# Patient Record
Sex: FEMALE | Race: WHITE | NOT HISPANIC OR LATINO | Employment: UNEMPLOYED | URBAN - METROPOLITAN AREA
[De-identification: names, ages, dates, MRNs, and addresses within clinical notes are randomized per-mention and may not be internally consistent; named-entity substitution may affect disease eponyms.]

---
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
## 2023-01-24 ENCOUNTER — OFFICE VISIT (OUTPATIENT)
Dept: FAMILY MEDICINE CLINIC | Facility: CLINIC | Age: 18
End: 2023-01-24
Payer: COMMERCIAL

## 2023-01-24 VITALS
BODY MASS INDEX: 26.98 KG/M2 | WEIGHT: 158 LBS | OXYGEN SATURATION: 98 % | TEMPERATURE: 98.2 F | HEIGHT: 64 IN | HEART RATE: 95 BPM

## 2023-01-24 DIAGNOSIS — F41.1 GAD (GENERALIZED ANXIETY DISORDER): Primary | ICD-10-CM

## 2023-01-24 DIAGNOSIS — Z09 ENCOUNTER FOR EXAMINATION FOLLOWING TREATMENT AT HOSPITAL: ICD-10-CM

## 2023-01-24 DIAGNOSIS — F41.0 PANIC ATTACK: ICD-10-CM

## 2023-01-24 PROCEDURE — 99213 OFFICE O/P EST LOW 20 MIN: CPT | Performed by: NURSE PRACTITIONER

## 2023-01-24 RX ORDER — VENLAFAXINE HYDROCHLORIDE 37.5 MG/1
37.5 CAPSULE, EXTENDED RELEASE ORAL DAILY
Qty: 30 CAPSULE | Refills: 0 | Status: SHIPPED | OUTPATIENT
Start: 2023-01-24 | End: 2023-01-27 | Stop reason: SDUPTHER

## 2023-01-24 NOTE — PROGRESS NOTES
Chief Complaint  Cough (Causing chest to hurt, went to ER 1 week ago and diagnosed with URI.), Sore Throat, and Anxiety (Refill Venlafaxine)    Subjective            Ashleigh Magdaleno presents to [facility]  History of Present Illness  Patient reports she was seen in the emergency room 1 week ago and diagnosed with atypical chest pain and anxiety and was placed on venlafaxine lowest dose 37.5 mg daily and given hydroxyzine for as needed use--they did do an EKG and it was normal and no labs are seen in the system and patient is here for a refill on the venlafaxine    And patient with the prior diagnosis of STEVE and was on Prozac 10 mg daily approx 3 weeks ago and pt was unable to take-and we only wrote the liquid prozac R/T last visit pt was explaining swallowing issues from the anxiety     Then pt did also reach out to her who she saw in the past through [facility] in [facility] --pt was on in the past:     effexor XR  zoloft  buspar (caused dizziness--could not take)   Hydroxyzine (possibly--pt unsure)   No CS prescribed    Then also pt reports now able to swallow the lowest dose effexor with trouble--    ______________________________________________________    Also pt reports the next day after seen in the  was seen in [place] ER for the URI s/s and they tested her for strep and COVID and flu and was (-) for everything--and just supportive care and nothing Rx given at that time       PHQ-2 Total Score:    PHQ-9 Total Score:      Past Medical History:   Diagnosis Date   • Anxiety    • Chlamydia infection during pregnancy 03/03/2022   • Febrile seizure (HCC)    • PCOS (polycystic ovarian syndrome)    • PFO (patent foramen ovale)        Allergies   Allergen Reactions   • Buspirone Dizziness and Other (See Comments)     dizziness        Past Surgical History:   Procedure Laterality Date   • ADENOIDECTOMY     • APPENDECTOMY     • LIPOMA EXCISION      foot   •  TONSILLECTOMY          Social History     Tobacco Use   • Smoking status: Never     Passive exposure: Never   • Smokeless tobacco: Never   Vaping Use   • Vaping Use: Every day   • Substances: Nicotine   • Devices: Disposable   Substance Use Topics   • Alcohol use: Never   • Drug use: Never       Family History   Problem Relation Age of Onset   • No Known Problems Mother    • No Known Problems Father    • Ovarian cancer Maternal Grandmother    • Colon cancer Maternal Grandmother         Health Maintenance Due   Topic Date Due   • COVID-19 Vaccine (1) Never done   • HPV VACCINES (2 - 3-dose series) 03/07/2019   • ANNUAL PHYSICAL  Never done   • INFLUENZA VACCINE  Never done        Current Outpatient Medications on File Prior to Visit   Medication Sig   • Etonogestrel (NEXPLANON) 68 MG implant subdermal implant Inject 1 each into the appropriate area of the skin as directed by provider 1 (One) Time.   • hydrOXYzine (ATARAX) 10 MG tablet Take 1 tablet by mouth 3 (Three) Times a Day As Needed for Anxiety.   • [DISCONTINUED] venlafaxine XR (Effexor XR) 37.5 MG 24 hr capsule Take 1 capsule by mouth Daily for 21 days.   • [DISCONTINUED] FLUoxetine (PROzac) 20 MG/5ML solution Take 2.5 mL by mouth Daily for 30 days.     No current facility-administered medications on file prior to visit.       Immunization History   Administered Date(s) Administered   • DTaP 2003, 2003, 2003, 05/10/2004, 02/21/2007   • DTaP, Unspecified 2003, 2003, 2003, 05/10/2004, 02/21/2007   • HPV Bivalent 02/07/2019   • Hep A, 2 Dose 07/08/2014, 08/07/2018   • Hep B / HiB 2003   • Hep B, Adolescent or Pediatric 2003, 2003, 01/28/2004, 12/08/2004   • Hepatitis A 07/08/2014, 08/07/2018   • Hepatitis B 2003, 2003, 12/18/2004   • HiB 2003, 2003, 12/08/2004   • Hib (PRP-OMP) 2003   • Hib (PRP-T) 2003, 2003, 12/08/2004   • IPV 2003, 2003, 01/28/2004,  "12/08/2004, 02/21/2007   • MMR 01/28/2004, 12/08/2004, 02/21/2007, 09/04/2022   • MMRV 02/21/2007   • Meningococcal Conjugate 07/08/2014, 02/07/2019   • Meningococcal MCV4P (Menactra) 07/08/2014, 02/07/2019   • PEDS-Pneumococcal Conjugate (PCV7) 2003, 2003, 2003   • Pneumococcal Conjugate 13-Valent (PCV13) 2003, 2003, 2003   • Tdap 09/27/2013   • Varicella 05/10/2004, 07/08/2014       Review of Systems   Constitutional: Positive for appetite change, chills and fatigue. Negative for fever.   HENT: Positive for postnasal drip, rhinorrhea and sinus pressure.    Respiratory: Positive for cough.    Cardiovascular: Positive for palpitations.        Some associated with the anxiety    Gastrointestinal: Positive for diarrhea. Negative for nausea and vomiting.   Neurological: Positive for headache.   Psychiatric/Behavioral: Negative for self-injury, suicidal ideas and depressed mood. The patient is nervous/anxious.         Objective     Pulse 95   Temp 98.2 °F (36.8 °C)   Ht 162.6 cm (64\")   Wt 71.7 kg (158 lb)   SpO2 98%   BMI 27.12 kg/m²       Physical Exam  Vitals and nursing note reviewed.   Constitutional:       Appearance: Normal appearance.   HENT:      Head: Normocephalic.      Right Ear: External ear normal.      Left Ear: External ear normal.      Nose: Nose normal.      Mouth/Throat:      Comments: Wearing mask  Eyes:      Pupils: Pupils are equal, round, and reactive to light.   Cardiovascular:      Rate and Rhythm: Normal rate and regular rhythm.      Heart sounds: Normal heart sounds.   Pulmonary:      Effort: Pulmonary effort is normal.      Breath sounds: Normal breath sounds.   Abdominal:      Palpations: Abdomen is soft.   Musculoskeletal:         General: Normal range of motion.      Cervical back: Normal range of motion.   Skin:     General: Skin is warm and dry.   Neurological:      Mental Status: She is alert and oriented to person, place, and time. "   Psychiatric:         Mood and Affect: Mood normal.         Behavior: Behavior normal.         Thought Content: Thought content normal.         Judgment: Judgment normal.         Result Review :             UC with Ever Bagley APRN (01/18/2023)  ECG 12 Lead Chest Pain (01/18/2023 17:14)               Assessment and Plan      Diagnoses and all orders for this visit:    1. STEVE (generalized anxiety disorder) (Primary)  -     venlafaxine XR (Effexor XR) 37.5 MG 24 hr capsule; Take 1 capsule by mouth Daily.  Dispense: 30 capsule; Refill: 0  -     Ambulatory Referral to Psychiatry    2. Panic attack  -     venlafaxine XR (Effexor XR) 37.5 MG 24 hr capsule; Take 1 capsule by mouth Daily.  Dispense: 30 capsule; Refill: 0  -     Ambulatory Referral to Psychiatry    3. Encounter for examination following treatment at hospital  Comments:  ER dept and UC     we will refill the effexor lowest dose and pt will F/U in approx 6 weeks prior to running out and then we will see if titration of the effexor is needed--as in the past pt was on 75 mg daily         Follow Up     Return in about 6 weeks (around 3/7/2023), or if symptoms worsen or fail to improve, for Recheck.

## 2023-01-27 DIAGNOSIS — F41.0 PANIC ATTACK: ICD-10-CM

## 2023-01-27 DIAGNOSIS — F41.1 GAD (GENERALIZED ANXIETY DISORDER): ICD-10-CM

## 2023-01-27 NOTE — TELEPHONE ENCOUNTER
Caller: Ashleigh Magdaleno    Relationship: Self    Best call back number: [phone]    Requested Prescriptions:   Requested Prescriptions     Pending Prescriptions Disp Refills   • venlafaxine XR (Effexor XR) 37.5 MG 24 hr capsule 30 capsule 0     Sig: Take 1 capsule by mouth Daily.        Pharmacy where request should be sent: [address] [phone] [facility] [phone] [facility]     Additional details provided by patient: PATIENT CALLED STATING THAT SHE WENT TO AN APPOINTMENT AND GOT THE MEDICATION PRESCRIBED BUT WHEN SHE WENT TO THE PHARMACY TODAY, THEY DID NOT HAVE ANY RECORD OF THE PRESCRIPTION BEING CALLED IN FOR HER. PATIENT STATES SHE HAS ABOUT A WEEK AND A HALF OF THE MEDICATION LEFT.    Does the patient have less than a 3 day supply:  [] Yes  [x] No    Would you like a call back once the refill request has been completed: [] Yes [x] No    If the office needs to give you a call back, can they leave a voicemail: [x] Yes [] No    Kym Urbina Rep   01/27/23 16:20 EST

## 2023-01-30 RX ORDER — VENLAFAXINE HYDROCHLORIDE 37.5 MG/1
37.5 CAPSULE, EXTENDED RELEASE ORAL DAILY
Qty: 30 CAPSULE | Refills: 0 | Status: SHIPPED | OUTPATIENT
Start: 2023-01-30 | End: 2023-03-06 | Stop reason: SDUPTHER

## 2023-02-16 ENCOUNTER — APPOINTMENT (OUTPATIENT)
Dept: GENERAL RADIOLOGY | Facility: HOSPITAL | Age: 18
End: 2023-02-16
Payer: COMMERCIAL

## 2023-02-16 ENCOUNTER — HOSPITAL ENCOUNTER (EMERGENCY)
Facility: HOSPITAL | Age: 18
Discharge: HOME | End: 2023-02-16
Attending: EMERGENCY MEDICINE | Admitting: EMERGENCY MEDICINE
Payer: COMMERCIAL

## 2023-02-16 VITALS
RESPIRATION RATE: 18 BRPM | HEART RATE: 116 BPM | BODY MASS INDEX: 26.5 KG/M2 | WEIGHT: 155.2 LBS | SYSTOLIC BLOOD PRESSURE: 133 MMHG | TEMPERATURE: 99.5 F | DIASTOLIC BLOOD PRESSURE: 75 MMHG | OXYGEN SATURATION: 97 % | HEIGHT: 64 IN

## 2023-02-16 DIAGNOSIS — N39.0 URINARY TRACT INFECTION WITH HEMATURIA, SITE UNSPECIFIED: Primary | ICD-10-CM

## 2023-02-16 DIAGNOSIS — M41.80 LEVOSCOLIOSIS: ICD-10-CM

## 2023-02-16 DIAGNOSIS — R31.9 URINARY TRACT INFECTION WITH HEMATURIA, SITE UNSPECIFIED: Primary | ICD-10-CM

## 2023-02-16 LAB
BACTERIA UR QL AUTO: ABNORMAL /HPF
BILIRUB UR QL STRIP: NEGATIVE
CLARITY UR: ABNORMAL
COLOR UR: YELLOW
GLUCOSE UR STRIP-MCNC: NEGATIVE MG/DL
HGB UR QL STRIP.AUTO: ABNORMAL
HYALINE CASTS UR QL AUTO: ABNORMAL /LPF
KETONES UR QL STRIP: NEGATIVE
LEUKOCYTE ESTERASE UR QL STRIP.AUTO: ABNORMAL
NITRITE UR QL STRIP: POSITIVE
PH UR STRIP.AUTO: 6 [PH] (ref 5–8)
PROT UR QL STRIP: ABNORMAL
RBC # UR STRIP: ABNORMAL /HPF
REF LAB TEST METHOD: ABNORMAL
SP GR UR STRIP: 1.02 (ref 1–1.03)
SQUAMOUS #/AREA URNS HPF: ABNORMAL /HPF
UROBILINOGEN UR QL STRIP: ABNORMAL
WBC # UR STRIP: ABNORMAL /HPF

## 2023-02-16 PROCEDURE — 87086 URINE CULTURE/COLONY COUNT: CPT

## 2023-02-16 PROCEDURE — 74018 RADEX ABDOMEN 1 VIEW: CPT

## 2023-02-16 PROCEDURE — 81001 URINALYSIS AUTO W/SCOPE: CPT | Performed by: EMERGENCY MEDICINE

## 2023-02-16 PROCEDURE — 87077 CULTURE AEROBIC IDENTIFY: CPT

## 2023-02-16 PROCEDURE — 99283 EMERGENCY DEPT VISIT LOW MDM: CPT

## 2023-02-16 PROCEDURE — 25010000002 KETOROLAC TROMETHAMINE PER 15 MG

## 2023-02-16 PROCEDURE — 25010000002 CEFTRIAXONE PER 250 MG

## 2023-02-16 PROCEDURE — 0 LIDOCAINE 1 % SOLUTION 10 ML VIAL

## 2023-02-16 PROCEDURE — 96372 THER/PROPH/DIAG INJ SC/IM: CPT

## 2023-02-16 PROCEDURE — 87186 SC STD MICRODIL/AGAR DIL: CPT

## 2023-02-16 RX ORDER — CEPHALEXIN 500 MG/1
500 CAPSULE ORAL 2 TIMES DAILY
Qty: 14 CAPSULE | Refills: 0 | Status: SHIPPED | OUTPATIENT
Start: 2023-02-16 | End: 2023-02-23

## 2023-02-16 RX ORDER — KETOROLAC TROMETHAMINE 30 MG/ML
30 INJECTION, SOLUTION INTRAMUSCULAR; INTRAVENOUS ONCE
Status: COMPLETED | OUTPATIENT
Start: 2023-02-16 | End: 2023-02-16

## 2023-02-16 RX ORDER — CYCLOBENZAPRINE HCL 10 MG
10 TABLET ORAL ONCE
Status: COMPLETED | OUTPATIENT
Start: 2023-02-16 | End: 2023-02-16

## 2023-02-16 RX ADMIN — KETOROLAC TROMETHAMINE 30 MG: 30 INJECTION, SOLUTION INTRAMUSCULAR; INTRAVENOUS at 10:45

## 2023-02-16 RX ADMIN — CYCLOBENZAPRINE 10 MG: 10 TABLET, FILM COATED ORAL at 10:45

## 2023-02-16 RX ADMIN — LIDOCAINE HYDROCHLORIDE 1 G: 10 INJECTION, SOLUTION INFILTRATION; PERINEURAL at 12:01

## 2023-02-16 NOTE — DISCHARGE INSTRUCTIONS
Please drink lots of fluids, please take your antibiotics as prescribed till finished for UTI  [facility] with your urine culture results and to discuss whether antibiotics need to be continued or switch to a different antibiotic

## 2023-02-16 NOTE — ED PROVIDER NOTES
Time: 10:37 AM EST  Date of encounter:  2/16/2023  Independent Historian/Clinical History and Information was obtained by:   Patient  Chief Complaint: back pain    History is limited by: N/A    History of Present Illness:  Patient is a 18 y.o. year old female who presents to the emergency department for evaluation of complaints of right flank pain x4 days.  Patient states that the pain radiates down her back around to the right lower quadrant and down her right anterior thigh.  Patient describes it as an ache.  She states that she slid from the bed a couple days ago hitting her left anterior thigh on the bed, denies falling onto her back or buttocks.  Patient also has complaints of hematuria that she noted yesterday.  She has taken Tylenol yesterday with no relief.  She denies saddle anesthesia, urinary or bowel incontinency.    HPI    Patient Care Team  Primary Care Provider: Lydia Lacy APRN    Past Medical History:     Allergies   Allergen Reactions   • Buspirone Dizziness and Other (See Comments)     dizziness     Past Medical History:   Diagnosis Date   • Anxiety    • Chlamydia infection during pregnancy 03/03/2022   • Febrile seizure (HCC)    • PCOS (polycystic ovarian syndrome)    • PFO (patent foramen ovale)      Past Surgical History:   Procedure Laterality Date   • ADENOIDECTOMY     • APPENDECTOMY     • LIPOMA EXCISION      foot   • TONSILLECTOMY       Family History   Problem Relation Age of Onset   • No Known Problems Mother    • No Known Problems Father    • Ovarian cancer Maternal Grandmother    • Colon cancer Maternal Grandmother        Home Medications:  Prior to Admission medications    Medication Sig Start Date End Date Taking? Authorizing Provider   Etonogestrel (NEXPLANON) 68 MG implant subdermal implant Inject 1 each into the appropriate area of the skin as directed by provider 1 (One) Time.    Provider, MD Concepción   hydrOXYzine (ATARAX) 10 MG tablet Take 1 tablet by mouth 3  "(Three) Times a Day As Needed for Anxiety. 1/18/23   Ever Bagley APRN   venlafaxine XR (Effexor XR) 37.5 MG 24 hr capsule Take 1 capsule by mouth Daily. 1/30/23   Lydia Lacy APRN        Social History:   Social History     Tobacco Use   • Smoking status: Never     Passive exposure: Never   • Smokeless tobacco: Never   Vaping Use   • Vaping Use: Every day   • Substances: Nicotine   • Devices: Disposable   Substance Use Topics   • Alcohol use: Never   • Drug use: Never         Review of Systems:  Review of Systems   Constitutional: Negative.    HENT: Negative.    Eyes: Negative.    Respiratory: Negative.    Cardiovascular: Negative.    Gastrointestinal: Negative.    Endocrine: Negative.    Genitourinary: Positive for flank pain and hematuria. Negative for dysuria.   Musculoskeletal: Positive for back pain.   Skin: Negative.    Allergic/Immunologic: Negative.    Neurological: Negative.    Hematological: Negative.    Psychiatric/Behavioral: Negative.         Physical Exam:  /75 (BP Location: Right arm, Patient Position: Sitting)   Pulse 116   Temp 99.5 °F (37.5 °C) (Oral)   Resp 18   Ht 162.6 cm (64\")   Wt 70.4 kg (155 lb 3.3 oz)   SpO2 97%   BMI 26.64 kg/m²     Physical Exam  Vitals and nursing note reviewed.   Constitutional:       Appearance: Normal appearance. She is normal weight.   HENT:      Head: Normocephalic and atraumatic.      Nose: Nose normal.      Mouth/Throat:      Mouth: Mucous membranes are moist.   Eyes:      Extraocular Movements: Extraocular movements intact.      Conjunctiva/sclera: Conjunctivae normal.      Pupils: Pupils are equal, round, and reactive to light.   Cardiovascular:      Rate and Rhythm: Normal rate and regular rhythm.      Heart sounds: Normal heart sounds.   Pulmonary:      Effort: Pulmonary effort is normal.      Breath sounds: Normal breath sounds.   Abdominal:      Palpations: Abdomen is soft.   Musculoskeletal:         General: Normal range of " motion.      Cervical back: Normal, normal range of motion and neck supple.      Thoracic back: Normal.      Lumbar back: Normal.        Back:    Skin:     General: Skin is warm and dry.   Neurological:      General: No focal deficit present.      Mental Status: She is alert and oriented to person, place, and time.   Psychiatric:         Mood and Affect: Mood normal.         Behavior: Behavior normal.                  Procedures:  Procedures      Medical Decision Making:      Comorbidities that affect care:    None    External Notes reviewed:    None      The following orders were placed and all results were independently analyzed by me:  Orders Placed This Encounter   Procedures   • XR Abdomen KUB   • Urinalysis With Microscopic If Indicated (No Culture) - Urine, Clean Catch   • Urinalysis, Microscopic Only - Urine, Clean Catch   • Urinalysis With Culture If Indicated -       Medications Given in the Emergency Department:  Medications   cefTRIAXone (ROCEPHIN) 350 mg/ml in lidocaine 1% IM syringe (1 gm vial) (has no administration in time range)   ketorolac (TORADOL) injection 30 mg (30 mg Intramuscular Given 2/16/23 1045)   cyclobenzaprine (FLEXERIL) tablet 10 mg (10 mg Oral Given 2/16/23 1045)        ED Course:         Labs:    Lab Results (last 24 hours)     Procedure Component Value Units Date/Time    Urinalysis With Microscopic If Indicated (No Culture) - Urine, Clean Catch [[id number]]  (Abnormal) Collected: 02/16/23 1034    Specimen: Urine, Clean Catch Updated: 02/16/23 1114     Color, UA Yellow     Appearance, UA Cloudy     pH, UA 6.0     Specific Gravity, UA 1.021     Glucose, UA Negative     Ketones, UA Negative     Bilirubin, UA Negative     Blood, UA Large (3+)     Protein, UA Trace     Leuk Esterase, UA Moderate (2+)     Nitrite, UA Positive     Urobilinogen, UA 1.0 E.U./dL    Urinalysis, Microscopic Only - Urine, Clean Catch [[id number]]  (Abnormal) Collected: 02/16/23 1034    Specimen: Urine, Clean  Catch Updated: 02/16/23 1114     RBC, UA None Seen /HPF      WBC, UA 0-2 /HPF      Bacteria, UA Trace /HPF      Squamous Epithelial Cells, UA None Seen /HPF      Hyaline Casts, UA None Seen /LPF      Methodology Manual Light Microscopy           Imaging:    XR Abdomen KUB    Result Date: 2/16/2023  PROCEDURE: XR ABDOMEN KUB  COMPARISON: [facility], , ABDOMEN SERIES ACUTE, 10/16/2019, 2:35.  INDICATIONS: dysuria/right sided flank pain for 2-3 days  FINDINGS:  There is a slight levoscoliosis of the lumbar spine which seems like a change from prior study.  There is a scattered amount a gas and residue within bowel in a nonspecific fashion.  No obvious radiopaque urinary tract calculi are appreciated.        1. Nonspecific bowel gas pattern. 2. Levoscoliosis. 3. No definite radiopaque urinary tract calculus.     KATLIN MARTÍNEZ MD       Electronically Signed and Approved By: KATLIN MARTÍNEZ MD on 2/16/2023 at 11:20                 Differential Diagnosis and Discussion:    Back Pain: The patient presents with back pain. My differential diagnosis includes but is not limited to acute spinal epidural abscess, acute spinal epidural bleed, cauda equina syndrome, abdominal aortic aneurysm, aortic dissection, kidney stone, pyelonephritis, musculoskeletal back pain, spinal fracture, and osteoarthritis.     All labs were reviewed and interpreted by me.  All X-rays were independently reviewed by me.    MDM     Patient Care Considerations:    CT ABDOMEN AND PELVIS: I considered ordering a CT scan of the abdomen and pelvis however KUB was opted       Consultants/Shared Management Plan:    None    Social Determinants of Health:    Patient is independent, reliable, and has access to care.       Disposition and Care Coordination:    Discharged: The patient is suitable and stable for discharge with no need for consideration of observation or admission.    I have explained discharge medications and the need for follow up with  the patient/caretakers. This was also printed in the discharge instructions. Patient was discharged with the following medications and follow up:      Medication List      New Prescriptions    cephalexin 500 MG capsule  Commonly known as: KEFLEX  Take 1 capsule by mouth 2 (Two) Times a Day for 7 days.           Where to Get Your Medications      These medications were sent to [place], [place] - [address] [place] [address] - [phone]  - [phone] [address] [address]    Phone: [phone]   · cephalexin 500 MG capsule      No follow-up provider specified.     Final diagnoses:   Urinary tract infection with hematuria, site unspecified        ED Disposition     ED Disposition   Discharge    Condition   Stable    Comment   --             This medical record created using voice recognition software.           Song Garcia PA-C  02/16/23 1141

## 2023-02-18 LAB — BACTERIA SPEC AEROBE CULT: ABNORMAL

## 2023-03-06 ENCOUNTER — OFFICE VISIT (OUTPATIENT)
Dept: FAMILY MEDICINE CLINIC | Facility: CLINIC | Age: 18
End: 2023-03-06
Payer: COMMERCIAL

## 2023-03-06 VITALS
OXYGEN SATURATION: 96 % | DIASTOLIC BLOOD PRESSURE: 68 MMHG | BODY MASS INDEX: 26.95 KG/M2 | TEMPERATURE: 97.1 F | HEART RATE: 107 BPM | WEIGHT: 157 LBS | SYSTOLIC BLOOD PRESSURE: 110 MMHG

## 2023-03-06 DIAGNOSIS — Z86.2 HISTORY OF ANEMIA: ICD-10-CM

## 2023-03-06 DIAGNOSIS — F41.0 PANIC ATTACK: Primary | ICD-10-CM

## 2023-03-06 DIAGNOSIS — F41.1 GAD (GENERALIZED ANXIETY DISORDER): ICD-10-CM

## 2023-03-06 DIAGNOSIS — R53.83 FATIGUE, UNSPECIFIED TYPE: ICD-10-CM

## 2023-03-06 DIAGNOSIS — R23.3 EASY BRUISABILITY: ICD-10-CM

## 2023-03-06 LAB
ALBUMIN SERPL-MCNC: 4.6 G/DL (ref 3.5–5.2)
ALBUMIN/GLOB SERPL: 1.6 G/DL
ALP SERPL-CCNC: 53 U/L (ref 39–117)
ALT SERPL W P-5'-P-CCNC: 15 U/L (ref 1–33)
ANION GAP SERPL CALCULATED.3IONS-SCNC: 8 MMOL/L (ref 5–15)
AST SERPL-CCNC: 16 U/L (ref 1–32)
BASOPHILS # BLD AUTO: 0.02 10*3/MM3 (ref 0–0.2)
BASOPHILS NFR BLD AUTO: 0.4 % (ref 0–1.5)
BILIRUB SERPL-MCNC: 0.2 MG/DL (ref 0–1.2)
BUN SERPL-MCNC: 7 MG/DL (ref 6–20)
BUN/CREAT SERPL: 14.9 (ref 7–25)
CALCIUM SPEC-SCNC: 9.8 MG/DL (ref 8.6–10.5)
CHLORIDE SERPL-SCNC: 105 MMOL/L (ref 98–107)
CO2 SERPL-SCNC: 25 MMOL/L (ref 22–29)
CREAT SERPL-MCNC: 0.47 MG/DL (ref 0.57–1)
DEPRECATED RDW RBC AUTO: 41.6 FL (ref 37–54)
EGFRCR SERPLBLD CKD-EPI 2021: 140 ML/MIN/1.73
EOSINOPHIL # BLD AUTO: 0.09 10*3/MM3 (ref 0–0.4)
EOSINOPHIL NFR BLD AUTO: 1.8 % (ref 0.3–6.2)
ERYTHROCYTE [DISTWIDTH] IN BLOOD BY AUTOMATED COUNT: 14.3 % (ref 12.3–15.4)
FERRITIN SERPL-MCNC: 25.5 NG/ML (ref 13–150)
FOLATE SERPL-MCNC: 10.2 NG/ML (ref 4.78–24.2)
GLOBULIN UR ELPH-MCNC: 2.8 GM/DL
GLUCOSE SERPL-MCNC: 77 MG/DL (ref 65–99)
HCT VFR BLD AUTO: 40 % (ref 34–46.6)
HGB BLD-MCNC: 12.9 G/DL (ref 12–15.9)
IMM GRANULOCYTES # BLD AUTO: 0.01 10*3/MM3 (ref 0–0.05)
IMM GRANULOCYTES NFR BLD AUTO: 0.2 % (ref 0–0.5)
IRON 24H UR-MRATE: 48 MCG/DL (ref 37–145)
IRON SATN MFR SERPL: 12 % (ref 20–50)
LYMPHOCYTES # BLD AUTO: 1.79 10*3/MM3 (ref 0.7–3.1)
LYMPHOCYTES NFR BLD AUTO: 36.6 % (ref 19.6–45.3)
MCH RBC QN AUTO: 26.1 PG (ref 26.6–33)
MCHC RBC AUTO-ENTMCNC: 32.3 G/DL (ref 31.5–35.7)
MCV RBC AUTO: 81 FL (ref 79–97)
MONOCYTES # BLD AUTO: 0.44 10*3/MM3 (ref 0.1–0.9)
MONOCYTES NFR BLD AUTO: 9 % (ref 5–12)
NEUTROPHILS NFR BLD AUTO: 2.54 10*3/MM3 (ref 1.7–7)
NEUTROPHILS NFR BLD AUTO: 52 % (ref 42.7–76)
NRBC BLD AUTO-RTO: 0 /100 WBC (ref 0–0.2)
PLATELET # BLD AUTO: 223 10*3/MM3 (ref 140–450)
PMV BLD AUTO: 10.7 FL (ref 6–12)
POTASSIUM SERPL-SCNC: 4.4 MMOL/L (ref 3.5–5.2)
PROT SERPL-MCNC: 7.4 G/DL (ref 6–8.5)
RBC # BLD AUTO: 4.94 10*6/MM3 (ref 3.77–5.28)
SODIUM SERPL-SCNC: 138 MMOL/L (ref 136–145)
TIBC SERPL-MCNC: 392 MCG/DL (ref 298–536)
TRANSFERRIN SERPL-MCNC: 263 MG/DL (ref 200–360)
TSH SERPL DL<=0.05 MIU/L-ACNC: 2.35 UIU/ML (ref 0.27–4.2)
VIT B12 BLD-MCNC: 577 PG/ML (ref 211–946)
WBC NRBC COR # BLD: 4.89 10*3/MM3 (ref 3.4–10.8)

## 2023-03-06 PROCEDURE — 82607 VITAMIN B-12: CPT | Performed by: NURSE PRACTITIONER

## 2023-03-06 PROCEDURE — 82746 ASSAY OF FOLIC ACID SERUM: CPT | Performed by: NURSE PRACTITIONER

## 2023-03-06 PROCEDURE — 80053 COMPREHEN METABOLIC PANEL: CPT | Performed by: NURSE PRACTITIONER

## 2023-03-06 PROCEDURE — 83540 ASSAY OF IRON: CPT | Performed by: NURSE PRACTITIONER

## 2023-03-06 PROCEDURE — 84443 ASSAY THYROID STIM HORMONE: CPT | Performed by: NURSE PRACTITIONER

## 2023-03-06 PROCEDURE — 84466 ASSAY OF TRANSFERRIN: CPT | Performed by: NURSE PRACTITIONER

## 2023-03-06 PROCEDURE — 85025 COMPLETE CBC W/AUTO DIFF WBC: CPT | Performed by: NURSE PRACTITIONER

## 2023-03-06 PROCEDURE — 82728 ASSAY OF FERRITIN: CPT | Performed by: NURSE PRACTITIONER

## 2023-03-06 PROCEDURE — 99214 OFFICE O/P EST MOD 30 MIN: CPT | Performed by: NURSE PRACTITIONER

## 2023-03-06 RX ORDER — VENLAFAXINE HYDROCHLORIDE 75 MG/1
75 CAPSULE, EXTENDED RELEASE ORAL DAILY
Qty: 30 CAPSULE | Refills: 2 | Status: SHIPPED | OUTPATIENT
Start: 2023-03-06

## 2023-03-06 NOTE — PROGRESS NOTES
Venipuncture Blood Specimen Collection  Venipuncture performed in left arm by Liza Whittaker with good hemostasis. Patient tolerated the procedure well without complications.   03/06/23   Liza Whittaker

## 2023-03-06 NOTE — PROGRESS NOTES
Chief Complaint  Anxiety (Med f/u )    Subjective            Ashleigh Magdaleno presents to [facility]  History of Present Illness  Pt here today for the F/U on the effexor for her anxiety--pt at last visit was seen in the ER dept and placed on this--and then she F/U with me--and then in the past: patient with the prior diagnosis of STEVE and was on Prozac 10 mg daily approx 3 weeks ago and pt was unable to take-and we only wrote the liquid prozac R/T last visit pt was explaining swallowing issues from the anxiety      Then pt did also reach out to her who she saw in the past through [facility] [place] --pt was on in the past:     effexor XR  zoloft  buspar (caused dizziness--could not take)   Hydroxyzine    No CS prescribed    Overall pt reports having less panic attacks and no SE other than she did notice she bruises easy and reports did that the last time--and no SI/HI and would yasmin to increase the dose and then F/U in 3 months and then she will monitor the bruising --pt also reports with regards to the eating issues and choking issues--now able to eat normal food and no choking but still reports having some panic attacks still but not having to take the hydroxyzine and would like to remain on the same medicine and increase the dose at this time does not want to switch to something different    Not breastfeeding       PHQ-2 Total Score: 1  PHQ-9 Total Score: 1    Past Medical History:   Diagnosis Date   • Anxiety    • Chlamydia infection during pregnancy 03/03/2022   • Febrile seizure (HCC)    • PCOS (polycystic ovarian syndrome)    • PFO (patent foramen ovale)        Allergies   Allergen Reactions   • Buspirone Dizziness and Other (See Comments)     dizziness        Past Surgical History:   Procedure Laterality Date   • ADENOIDECTOMY     • APPENDECTOMY     • LIPOMA EXCISION      foot   • TONSILLECTOMY          Social History     Tobacco Use   • Smoking status:  Never     Passive exposure: Never   • Smokeless tobacco: Never   Vaping Use   • Vaping Use: Every day   • Substances: Nicotine   • Devices: Disposable   Substance Use Topics   • Alcohol use: Never   • Drug use: Never       Family History   Problem Relation Age of Onset   • No Known Problems Mother    • No Known Problems Father    • Ovarian cancer Maternal Grandmother    • Colon cancer Maternal Grandmother         Health Maintenance Due   Topic Date Due   • COVID-19 Vaccine (1) Never done   • HPV VACCINES (2 - 3-dose series) 03/07/2019   • ANNUAL PHYSICAL  Never done   • INFLUENZA VACCINE  Never done        Current Outpatient Medications on File Prior to Visit   Medication Sig   • Etonogestrel (NEXPLANON) 68 MG implant subdermal implant Inject 1 each into the appropriate area of the skin as directed by provider 1 (One) Time.   • hydrOXYzine (ATARAX) 10 MG tablet Take 1 tablet by mouth 3 (Three) Times a Day As Needed for Anxiety.   • [DISCONTINUED] venlafaxine XR (Effexor XR) 37.5 MG 24 hr capsule Take 1 capsule by mouth Daily.     No current facility-administered medications on file prior to visit.       Immunization History   Administered Date(s) Administered   • DTaP 2003, 2003, 2003, 05/10/2004, 02/21/2007   • DTaP, Unspecified 2003, 2003, 2003, 05/10/2004, 02/21/2007   • HPV Bivalent 02/07/2019   • Hep A, 2 Dose 07/08/2014, 08/07/2018   • Hep B / HiB 2003   • Hep B, Adolescent or Pediatric 2003, 2003, 01/28/2004, 12/08/2004   • Hepatitis A 07/08/2014, 08/07/2018   • Hepatitis B 2003, 2003, 12/18/2004   • HiB 2003, 2003, 12/08/2004   • Hib (PRP-OMP) 2003   • Hib (PRP-T) 2003, 2003, 12/08/2004   • IPV 2003, 2003, 01/28/2004, 12/08/2004, 02/21/2007   • MMR 01/28/2004, 12/08/2004, 02/21/2007, 09/04/2022   • MMRV 02/21/2007   • Meningococcal Conjugate 07/08/2014, 02/07/2019   • Meningococcal MCV4P (Menactra)  07/08/2014, 02/07/2019   • PEDS-Pneumococcal Conjugate (PCV7) 2003, 2003, 2003   • Pneumococcal Conjugate 13-Valent (PCV13) 2003, 2003, 2003   • Tdap 09/27/2013   • Varicella 05/10/2004, 07/08/2014       Review of Systems   Constitutional: Positive for fatigue.   HENT: Negative for nosebleeds.    Respiratory: Negative for shortness of breath.         Only if having a panic attack    Cardiovascular: Negative for chest pain.        Only if having a panic attack    Gastrointestinal: Negative for abdominal pain and blood in stool.   Genitourinary: Positive for amenorrhea. Negative for hematuria, menstrual problem and vaginal bleeding.        Has the nexplanon    Neurological: Positive for dizziness and headache. Negative for seizures, syncope and light-headedness.        Happens approx 1-2 hrs after taking the med and then relaxed and drowsy and mild HA--and takes the tylenol for this    Hematological: Bruises/bleeds easily.   Psychiatric/Behavioral: Negative for self-injury, suicidal ideas and depressed mood. The patient is nervous/anxious.         Objective     /68   Pulse 107   Temp 97.1 °F (36.2 °C)   Wt 71.2 kg (157 lb)   SpO2 96%   BMI 26.95 kg/m²       Physical Exam  Vitals and nursing note reviewed.   Constitutional:       Appearance: Normal appearance.   HENT:      Head: Normocephalic.      Right Ear: External ear normal.      Left Ear: External ear normal.      Nose: Nose normal.      Mouth/Throat:      Comments: Wearing mask  Eyes:      Pupils: Pupils are equal, round, and reactive to light.   Cardiovascular:      Rate and Rhythm: Normal rate and regular rhythm.      Heart sounds: Normal heart sounds.   Pulmonary:      Effort: Pulmonary effort is normal.      Breath sounds: Normal breath sounds.   Musculoskeletal:         General: Normal range of motion.      Cervical back: Normal range of motion.   Skin:     General: Skin is warm and dry.      Findings: Bruising  present.      Comments: To the inner right FA--greenish bruise and approx couple cm in size    Neurological:      Mental Status: She is alert and oriented to person, place, and time.   Psychiatric:         Mood and Affect: Mood normal.         Behavior: Behavior normal.         Thought Content: Thought content normal.         Judgment: Judgment normal.         Result Review :                          Assessment and Plan      Diagnoses and all orders for this visit:    1. Panic attack (Primary)  -     venlafaxine XR (Effexor XR) 75 MG 24 hr capsule; Take 1 capsule by mouth Daily.  Dispense: 30 capsule; Refill: 2    2. STEVE (generalized anxiety disorder)  -     venlafaxine XR (Effexor XR) 75 MG 24 hr capsule; Take 1 capsule by mouth Daily.  Dispense: 30 capsule; Refill: 2  -     TSH  -     Vitamin B12 & Folate  -     CBC & Differential  -     Comprehensive Metabolic Panel    3. Easy bruisability  -     TSH  -     Vitamin B12 & Folate  -     CBC & Differential  -     Comprehensive Metabolic Panel  -     Iron Profile  -     Ferritin    4. Fatigue, unspecified type  -     TSH  -     Vitamin B12 & Folate  -     CBC & Differential  -     Comprehensive Metabolic Panel  -     Iron Profile  -     Ferritin    5. History of anemia  -     Iron Profile  -     Ferritin    Advised patient to reach out to me should the bruising persist or get worse and we will get labs today as well and since the patient is overall doing well on the Effexor 37.5 but still having some of the panic attacks we will increase to the 75 mg daily and patient will monitor for any bruising that is excessive for any signs or symptoms of bleeding        Follow Up     Return in about 3 months (around 6/6/2023), or if symptoms worsen or fail to improve, for Recheck.

## 2023-03-10 NOTE — PROGRESS NOTES
Please mail letter to patient stating    Ashleigh comprehensive panel shows normal kidney and liver function test and normal electrolytes and normal glucose; all of your blood counts were normal range; and the total iron and transferrin and total iron-binding capacity were all normal range and then the iron saturation was slightly low at 12% and should be 20%; and the ferritin and the thyroid levels and the vitamin B12 and folate acid levels were all normal range

## 2023-03-15 ENCOUNTER — TELEPHONE (OUTPATIENT)
Dept: OBSTETRICS AND GYNECOLOGY | Facility: CLINIC | Age: 18
End: 2023-03-15
Payer: COMMERCIAL

## 2023-03-17 ENCOUNTER — OFFICE VISIT (OUTPATIENT)
Dept: OBSTETRICS AND GYNECOLOGY | Facility: CLINIC | Age: 18
End: 2023-03-17
Payer: COMMERCIAL

## 2023-03-17 VITALS
HEIGHT: 64 IN | HEART RATE: 89 BPM | WEIGHT: 156.2 LBS | DIASTOLIC BLOOD PRESSURE: 79 MMHG | SYSTOLIC BLOOD PRESSURE: 126 MMHG | BODY MASS INDEX: 26.67 KG/M2

## 2023-03-17 DIAGNOSIS — Z80.41 FAMILY HISTORY OF OVARIAN CANCER: ICD-10-CM

## 2023-03-17 DIAGNOSIS — Z80.3 FAMILY HISTORY OF BREAST CANCER: ICD-10-CM

## 2023-03-17 DIAGNOSIS — N64.4 MASTALGIA: Primary | ICD-10-CM

## 2023-03-17 PROCEDURE — 1159F MED LIST DOCD IN RCRD: CPT | Performed by: NURSE PRACTITIONER

## 2023-03-17 PROCEDURE — 1160F RVW MEDS BY RX/DR IN RCRD: CPT | Performed by: NURSE PRACTITIONER

## 2023-03-17 PROCEDURE — 99213 OFFICE O/P EST LOW 20 MIN: CPT | Performed by: NURSE PRACTITIONER

## 2023-03-17 NOTE — PROGRESS NOTES
"GYN Problem/Follow Up Visit    Chief Complaint   Patient presents with   • Follow-up     Left Breast Lump/Pain           HPI  Ashleigh Magdaleno is a 18 y.o. female, , who presents for bilateral breast tenderness, palpable left breast lump, first detected 2 weeks ago, now resolved. No nipple discharge, bleeding, skin changes.   Postpartum 7 month, discontinued lactation 4 months ago.   Use of Nexplanon for contraception, placed  22, no menses after Nexplanon placed.   No breast trauma  Family history breast and ovarian cancer MGM    Minimal caffeine intake    Additional OB/GYN History   No LMP recorded. Patient has had an implant.  Current contraception: contraceptive methods: Nexplanon    Past Medical History:   Diagnosis Date   • Anxiety    • Chlamydia infection during pregnancy 2022   • Febrile seizure (HCC)    • PCOS (polycystic ovarian syndrome)    • PFO (patent foramen ovale)       Past Surgical History:   Procedure Laterality Date   • ADENOIDECTOMY     • APPENDECTOMY     • LIPOMA EXCISION      foot   • TONSILLECTOMY        Family History   Problem Relation Age of Onset   • No Known Problems Father    • No Known Problems Mother    • Ovarian cancer Maternal Grandmother    • Colon cancer Maternal Grandmother    • Breast cancer Maternal Grandmother 30   • Prostate cancer Neg Hx    • Uterine cancer Neg Hx      Allergies as of 2023 - Reviewed 2023   Allergen Reaction Noted   • Buspirone Dizziness and Other (See Comments) 2021      The additional following portions of the patient's history were reviewed and updated as appropriate: allergies, current medications, past family history, past medical history, past social history, past surgical history and problem list.    Review of Systems    See HPI for pertinent ROS    Objective   /79   Pulse 89   Ht 162.6 cm (64\")   Wt 70.9 kg (156 lb 3.2 oz)   Breastfeeding No   BMI 26.81 kg/m²     Physical Exam  Vitals and nursing " note reviewed. Exam conducted with a chaperone present.   Constitutional:       General: She is not in acute distress.     Appearance: Normal appearance. She is well-groomed.   Cardiovascular:      Rate and Rhythm: Normal rate and regular rhythm.      Heart sounds: Normal heart sounds.   Pulmonary:      Effort: Pulmonary effort is normal.      Breath sounds: Normal breath sounds.   Chest:   Breasts:     Breasts are symmetrical.      Right: Normal. No inverted nipple, mass, nipple discharge, skin change or tenderness.      Left: Normal. No inverted nipple, mass, nipple discharge, skin change or tenderness.   Lymphadenopathy:      Cervical: No cervical adenopathy.      Upper Body:      Right upper body: No supraclavicular, axillary or pectoral adenopathy.      Left upper body: No supraclavicular, axillary or pectoral adenopathy.   Neurological:      Mental Status: She is alert.   Psychiatric:         Behavior: Behavior is cooperative.          Assessment and Plan    Diagnoses and all orders for this visit:    1. Mastalgia (Primary)    2. Family history of breast cancer  -     [employer] Hereditary Cancer Screen    3. Family history of ovarian cancer  -     [facility] Hereditary Cancer Screen      Counseling:  • Self breast awareness, follow up if lump returns. Counseled regarding cyclical and hormonal changes of the breast. Family history of breast cancer in [facility] [place], Family history ovarian cancer [facility]  • Desires genetic testing, qualified for myriad testing  • Limit caffeine intake    She understands the importance of having the above orders performed in a timely fashion.  The risks of not performing them include, but are not limited to, cancer and/or subsequent increase in morbidity and/or mortality.  She is encouraged to review her results online and/or contact or office if she has questions.     Follow Up:  Return if symptoms worsen or fail to improve, for 6 weeks pati.        Makeda Garcia,  APRN  03/17/2023

## 2023-03-23 ENCOUNTER — HOSPITAL ENCOUNTER (EMERGENCY)
Facility: HOSPITAL | Age: 18
Discharge: HOME | End: 2023-03-23
Attending: EMERGENCY MEDICINE | Admitting: EMERGENCY MEDICINE
Payer: COMMERCIAL

## 2023-03-23 VITALS
TEMPERATURE: 99.9 F | SYSTOLIC BLOOD PRESSURE: 108 MMHG | HEART RATE: 113 BPM | WEIGHT: 155.2 LBS | HEIGHT: 64 IN | RESPIRATION RATE: 20 BRPM | OXYGEN SATURATION: 97 % | DIASTOLIC BLOOD PRESSURE: 61 MMHG | BODY MASS INDEX: 26.5 KG/M2

## 2023-03-23 DIAGNOSIS — Z20.822 COVID-19 VIRUS TEST RESULT UNKNOWN: ICD-10-CM

## 2023-03-23 DIAGNOSIS — J02.0 STREP PHARYNGITIS: Primary | ICD-10-CM

## 2023-03-23 DIAGNOSIS — R68.89 FLU-LIKE SYMPTOMS: ICD-10-CM

## 2023-03-23 LAB
FLUAV AG NPH QL: NEGATIVE
FLUBV AG NPH QL IA: NEGATIVE

## 2023-03-23 PROCEDURE — 25010000002 KETOROLAC TROMETHAMINE PER 15 MG: Performed by: NURSE PRACTITIONER

## 2023-03-23 PROCEDURE — 99283 EMERGENCY DEPT VISIT LOW MDM: CPT

## 2023-03-23 PROCEDURE — C9803 HOPD COVID-19 SPEC COLLECT: HCPCS | Performed by: NURSE PRACTITIONER

## 2023-03-23 PROCEDURE — U0004 COV-19 TEST NON-CDC HGH THRU: HCPCS | Performed by: NURSE PRACTITIONER

## 2023-03-23 PROCEDURE — 87804 INFLUENZA ASSAY W/OPTIC: CPT | Performed by: NURSE PRACTITIONER

## 2023-03-23 PROCEDURE — 96372 THER/PROPH/DIAG INJ SC/IM: CPT

## 2023-03-23 RX ORDER — KETOROLAC TROMETHAMINE 10 MG/1
10 TABLET, FILM COATED ORAL EVERY 6 HOURS PRN
Qty: 20 TABLET | Refills: 0 | Status: SHIPPED | OUTPATIENT
Start: 2023-03-23

## 2023-03-23 RX ORDER — KETOROLAC TROMETHAMINE 30 MG/ML
30 INJECTION, SOLUTION INTRAMUSCULAR; INTRAVENOUS ONCE
Status: COMPLETED | OUTPATIENT
Start: 2023-03-23 | End: 2023-03-23

## 2023-03-23 RX ADMIN — KETOROLAC TROMETHAMINE 30 MG: 30 INJECTION, SOLUTION INTRAMUSCULAR; INTRAVENOUS at 15:56

## 2023-03-23 NOTE — ED PROVIDER NOTES
Time: 3:28 PM EDT  Date of encounter:  3/23/2023  Independent Historian/Clinical History and Information was obtained by:   Patient and Family  Chief Complaint: myalgias    History is limited by: N/A    History of Present Illness:  Patient is a 18 y.o. year old female who presents to the emergency department for evaluation of myalgias.    Patient presents today accompanied by her mother. The patient reports she was diagnosed with strep throat yesterday at an [facility] and was prescribed amoxicillin. She starting having symptoms 2 days ago. She complains of generalized myalgias, ear pain, headache, and fever (Tmax 103.2 via temporal). She reports when she woke up this morning, she was in agonizing pain and felt like she couldn't move. The pain was not better with adjustment to any position at rest. She has taken Tylenol at home and has taken her first dose of amoxicillin. They state she was not tested for influenza or Covid.          Patient Care Team  Primary Care Provider: Lydia Lacy APRN    Past Medical History:     Allergies   Allergen Reactions   • Buspirone Dizziness and Other (See Comments)     dizziness     Past Medical History:   Diagnosis Date   • Anxiety    • Chlamydia infection during pregnancy 03/03/2022   • Febrile seizure (HCC)    • PCOS (polycystic ovarian syndrome)    • PFO (patent foramen ovale)      Past Surgical History:   Procedure Laterality Date   • ADENOIDECTOMY     • APPENDECTOMY     • LIPOMA EXCISION      foot   • TONSILLECTOMY       Family History   Problem Relation Age of Onset   • No Known Problems Father    • No Known Problems Mother    • Ovarian cancer Maternal Grandmother    • Colon cancer Maternal Grandmother    • Breast cancer Maternal Grandmother 30   • Prostate cancer Neg Hx    • Uterine cancer Neg Hx        Home Medications:  Prior to Admission medications    Medication Sig Start Date End Date Taking? Authorizing Provider   Etonogestrel (NEXPLANON) 68 MG implant subdermal  "implant Inject 1 each into the appropriate area of the skin as directed by provider 1 (One) Time.    Provider, MD Concepción   hydrOXYzine (ATARAX) 10 MG tablet Take 1 tablet by mouth 3 (Three) Times a Day As Needed for Anxiety. 1/18/23   Ever Bagley APRN   venlafaxine XR (Effexor XR) 75 MG 24 hr capsule Take 1 capsule by mouth Daily. 3/6/23   Lydia Lacy APRN        Social History:   Social History     Tobacco Use   • Smoking status: Never     Passive exposure: Never   • Smokeless tobacco: Never   Vaping Use   • Vaping Use: Every day   • Substances: Nicotine   • Devices: Disposable   Substance Use Topics   • Alcohol use: Never   • Drug use: Never         Review of Systems:  Review of Systems   Constitutional: Positive for activity change, fatigue and fever (103.2). Negative for chills.   HENT: Positive for ear pain and sore throat.    Eyes: Negative for pain.   Respiratory: Positive for cough and shortness of breath.    Cardiovascular: Negative for chest pain.   Gastrointestinal: Positive for nausea. Negative for abdominal pain, diarrhea and vomiting.   Genitourinary: Negative for decreased urine volume, dysuria and flank pain.   Musculoskeletal: Positive for back pain and myalgias.   Skin: Negative for rash.   Neurological: Positive for weakness (generalized) and headaches. Negative for seizures.   All other systems reviewed and are negative.       Physical Exam:  /61 (BP Location: Right arm, Patient Position: Sitting)   Pulse 113   Temp 99.9 °F (37.7 °C) (Oral)   Resp 20   Ht 162.6 cm (64\")   Wt 70.4 kg (155 lb 3.3 oz)   SpO2 97%   BMI 26.64 kg/m²     Physical Exam  Vitals and nursing note reviewed.   Constitutional:       General: She is not in acute distress.     Appearance: Normal appearance. She is normal weight. She is not ill-appearing, toxic-appearing or diaphoretic.   HENT:      Head: Normocephalic and atraumatic.      Right Ear: External ear normal.      Left Ear: External " ear normal.      Mouth/Throat:      Mouth: Mucous membranes are moist.      Pharynx: Uvula midline. Posterior oropharyngeal erythema present. No pharyngeal swelling, oropharyngeal exudate or uvula swelling.      Tonsils: No tonsillar exudate or tonsillar abscesses.   Eyes:      General: No scleral icterus.     Conjunctiva/sclera: Conjunctivae normal.      Pupils: Pupils are equal, round, and reactive to light.   Cardiovascular:      Rate and Rhythm: Regular rhythm. Tachycardia present.      Heart sounds: Normal heart sounds.   Pulmonary:      Effort: Pulmonary effort is normal. No respiratory distress.      Breath sounds: Normal breath sounds.   Abdominal:      General: Abdomen is flat. Bowel sounds are normal. There is no distension.      Palpations: Abdomen is soft.      Tenderness: There is no abdominal tenderness.   Musculoskeletal:         General: No swelling, tenderness, deformity or signs of injury. Normal range of motion.      Cervical back: Normal range of motion and neck supple.   Skin:     General: Skin is warm and dry.      Capillary Refill: Capillary refill takes less than 2 seconds.   Neurological:      General: No focal deficit present.      Mental Status: She is alert and oriented to person, place, and time.   Psychiatric:         Mood and Affect: Mood normal.         Behavior: Behavior normal.                  Procedures:  Procedures      Medical Decision Making:      Comorbidities that affect care:    PFO, febrile seizures, anxiety, PCOS, chlamydia    External Notes reviewed:    None      The following orders were placed and all results were independently analyzed by me:  Orders Placed This Encounter   Procedures   • Influenza Antigen, Rapid - Swab, Nasopharynx   • COVID-19,APTIMA PANTHER(YEIMY), FL[facility]ENC[facility]/ [facility], NP/OP SWAB IN UTM/VTM/SALINE TRANSPORT MEDIA,24 HR TAT - Swab, Nasopharynx       Medications Given in the Emergency Department:  Medications   ketorolac (TORADOL) injection 30 mg (30 mg  Intramuscular Given 3/23/23 1556)        ED Course:         Labs:    Lab Results (last 24 hours)     Procedure Component Value Units Date/Time    Influenza Antigen, Rapid - Swab, Nasopharynx [[id number]]  (Normal) Collected: 03/23/23 1600    Specimen: Swab from Nasopharynx Updated: 03/23/23 1625     Influenza A Ag, EIA Negative     Influenza B Ag, EIA Negative    COVID-19,APTIMA PANTHER(YEIMY),[facility], NP/OP SWAB IN UTM/VTM/SALINE TRANSPORT MEDIA,24 HR TAT - Swab, Nasopharynx [[id number]] Collected: 03/23/23 1600    Specimen: Swab from Nasopharynx Updated: 03/23/23 1602           Imaging:    No Radiology Exams Resulted Within Past 24 Hours      Differential Diagnosis and Discussion:    Sore Throat: Differential diagnosis includes but is not limited to bacterial infection, viral infection, inhaled irritants, sinus drainage, thyroiditis, epiglottitis, and retropharyngeal abscess.    All labs were reviewed and interpreted by me.    [facility]         Patient Care Considerations:    CHEST X-RAY: I considered ordering a chest x-ray however Lung sounds are clear, no active coughing or short of breath.  Oxygen sats 97% on room air.      Consultants/Shared Management Plan:    None    Social Determinants of Health:    Patient is independent, reliable, and has access to care.       Disposition and Care Coordination:    Discharged: The patient is suitable and stable for discharge with no need for consideration of observation or admission.    I have explained the patient´s condition, diagnoses and treatment plan based on the information available to me at this time. I have answered questions and addressed any concerns. The patient has a good  understanding of the patient´s diagnosis, condition, and treatment plan as can be expected at this point. The vital signs have been stable. The patient´s condition is stable and appropriate for discharge from the emergency department.      The patient will pursue further outpatient evaluation  with the primary care physician or other designated or consulting physician as outlined in the discharge instructions. They are agreeable to this plan of care and follow-up instructions have been explained in detail. The patient has received these instructions in written format and have expressed an understanding of the discharge instructions. The patient is aware that any significant change in condition or worsening of symptoms should prompt an immediate return to this or the closest emergency department or call to 911.    Final diagnoses:   Strep pharyngitis   Flu-like symptoms   COVID-19 virus test result unknown        ED Disposition     ED Disposition   Discharge    Condition   Stable    Comment   --             This medical record created using voice recognition software.    Laila ENCARNACION, am scribing for and in the presence of PETE Urrutia. 3/23/2023 20:59 EDT    Lexy ENCARNACION APRN, personally performed the services described in this documentation, as scribed by PETE Urrutia in my presence, and it is both accurate and complete.       Laila Celis  03/23/23 1538       Lexy Hardwick APRN  03/23/23 2059

## 2023-03-24 LAB — SARS-COV-2 RNA RESP QL NAA+PROBE: DETECTED

## 2023-06-15 DIAGNOSIS — F41.0 PANIC ATTACK: ICD-10-CM

## 2023-06-15 DIAGNOSIS — F41.1 GAD (GENERALIZED ANXIETY DISORDER): ICD-10-CM

## 2023-06-15 RX ORDER — VENLAFAXINE HYDROCHLORIDE 75 MG/1
75 CAPSULE, EXTENDED RELEASE ORAL DAILY
Qty: 30 CAPSULE | Refills: 0 | Status: SHIPPED | OUTPATIENT
Start: 2023-06-15 | End: 2023-06-22 | Stop reason: DRUGHIGH

## 2023-10-05 VITALS
BODY MASS INDEX: 27.29 KG/M2 | RESPIRATION RATE: 18 BRPM | SYSTOLIC BLOOD PRESSURE: 126 MMHG | WEIGHT: 159.83 LBS | HEIGHT: 64 IN | TEMPERATURE: 98.7 F | OXYGEN SATURATION: 100 % | DIASTOLIC BLOOD PRESSURE: 104 MMHG | HEART RATE: 76 BPM

## 2023-10-05 LAB
FLUAV AG NPH QL: NEGATIVE
FLUBV AG NPH QL IA: NEGATIVE
SARS-COV-2 RNA RESP QL NAA+PROBE: NOT DETECTED

## 2023-10-05 PROCEDURE — 87804 INFLUENZA ASSAY W/OPTIC: CPT

## 2023-10-05 PROCEDURE — 99284 EMERGENCY DEPT VISIT MOD MDM: CPT

## 2023-10-05 PROCEDURE — 87635 SARS-COV-2 COVID-19 AMP PRB: CPT

## 2023-10-06 ENCOUNTER — APPOINTMENT (OUTPATIENT)
Dept: CT IMAGING | Facility: HOSPITAL | Age: 18
End: 2023-10-06
Payer: COMMERCIAL

## 2023-10-06 ENCOUNTER — HOSPITAL ENCOUNTER (EMERGENCY)
Facility: HOSPITAL | Age: 18
Discharge: HOME | End: 2023-10-06
Attending: EMERGENCY MEDICINE
Payer: COMMERCIAL

## 2023-10-06 DIAGNOSIS — R51.9 HEADACHE, UNSPECIFIED HEADACHE TYPE: Primary | ICD-10-CM

## 2023-10-06 LAB — S PYO AG THROAT QL: NEGATIVE

## 2023-10-06 PROCEDURE — 87880 STREP A ASSAY W/OPTIC: CPT | Performed by: REGISTERED NURSE

## 2023-10-06 PROCEDURE — 70450 CT HEAD/BRAIN W/O DYE: CPT

## 2023-10-06 PROCEDURE — 96374 THER/PROPH/DIAG INJ IV PUSH: CPT

## 2023-10-06 PROCEDURE — 25010000002 KETOROLAC TROMETHAMINE PER 15 MG: Performed by: REGISTERED NURSE

## 2023-10-06 PROCEDURE — 87081 CULTURE SCREEN ONLY: CPT | Performed by: REGISTERED NURSE

## 2023-10-06 PROCEDURE — 96375 TX/PRO/DX INJ NEW DRUG ADDON: CPT

## 2023-10-06 PROCEDURE — 25010000002 DIPHENHYDRAMINE PER 50 MG: Performed by: REGISTERED NURSE

## 2023-10-06 PROCEDURE — 96372 THER/PROPH/DIAG INJ SC/IM: CPT

## 2023-10-06 PROCEDURE — 25810000003 SODIUM CHLORIDE 0.9 % SOLUTION: Performed by: REGISTERED NURSE

## 2023-10-06 PROCEDURE — 25010000002 METOCLOPRAMIDE PER 10 MG: Performed by: REGISTERED NURSE

## 2023-10-06 RX ORDER — SODIUM CHLORIDE 9 MG/ML
125 INJECTION, SOLUTION INTRAVENOUS CONTINUOUS
Status: CANCELLED | OUTPATIENT
Start: 2023-10-06

## 2023-10-06 RX ORDER — KETOROLAC TROMETHAMINE 30 MG/ML
60 INJECTION, SOLUTION INTRAMUSCULAR; INTRAVENOUS ONCE
Status: COMPLETED | OUTPATIENT
Start: 2023-10-06 | End: 2023-10-06

## 2023-10-06 RX ORDER — SODIUM CHLORIDE 0.9 % (FLUSH) 0.9 %
10 SYRINGE (ML) INJECTION AS NEEDED
Status: CANCELLED | OUTPATIENT
Start: 2023-10-06

## 2023-10-06 RX ORDER — KETOROLAC TROMETHAMINE 15 MG/ML
30 INJECTION, SOLUTION INTRAMUSCULAR; INTRAVENOUS ONCE
Status: DISCONTINUED | OUTPATIENT
Start: 2023-10-06 | End: 2023-10-06

## 2023-10-06 RX ORDER — METOCLOPRAMIDE HYDROCHLORIDE 5 MG/ML
10 INJECTION INTRAMUSCULAR; INTRAVENOUS ONCE
Status: COMPLETED | OUTPATIENT
Start: 2023-10-06 | End: 2023-10-06

## 2023-10-06 RX ORDER — DIPHENHYDRAMINE HYDROCHLORIDE 50 MG/ML
25 INJECTION INTRAMUSCULAR; INTRAVENOUS ONCE
Status: COMPLETED | OUTPATIENT
Start: 2023-10-06 | End: 2023-10-06

## 2023-10-06 RX ADMIN — KETOROLAC TROMETHAMINE 60 MG: 60 INJECTION, SOLUTION INTRAMUSCULAR at 01:57

## 2023-10-06 RX ADMIN — METOCLOPRAMIDE HYDROCHLORIDE 10 MG: 5 INJECTION INTRAMUSCULAR; INTRAVENOUS at 01:55

## 2023-10-06 RX ADMIN — SODIUM CHLORIDE 1000 ML: 9 INJECTION, SOLUTION INTRAVENOUS at 01:53

## 2023-10-06 RX ADMIN — DIPHENHYDRAMINE HYDROCHLORIDE 25 MG: 50 INJECTION, SOLUTION INTRAMUSCULAR; INTRAVENOUS at 01:54

## 2023-10-06 NOTE — ED PROVIDER NOTES
Time: 12:49 AM EDT  Date of encounter:  10/5/2023  Independent Historian/Clinical History and Information was obtained by:   Patient    History is limited by: N/A    Chief Complaint: Headache, sore throat, dizziness, near syncope      History of Present Illness:  Patient is a 18 y.o. year old female who presents to the emergency department accompanied by her grandfather for evaluation of headache, dizziness, sore throat and near syncope that started just about 3 hours ago.  Patient was seen at urgent care and advised to present to ED for further evaluation.  Patient denies recent illness, fever/chills, nausea/vomiting, abdominal pain.    HPI    Patient Care Team  Primary Care Provider: Lydia Lacy APRN    Past Medical History:     Allergies   Allergen Reactions    Buspirone Dizziness and Other (See Comments)     dizziness     Past Medical History:   Diagnosis Date    Anxiety     Chlamydia infection during pregnancy 03/03/2022    Febrile seizure     PCOS (polycystic ovarian syndrome)     PFO (patent foramen ovale)      Past Surgical History:   Procedure Laterality Date    ADENOIDECTOMY      APPENDECTOMY      LIPOMA EXCISION      foot    TONSILLECTOMY       Family History   Problem Relation Age of Onset    No Known Problems Father     No Known Problems Mother     Ovarian cancer Maternal Grandmother     Colon cancer Maternal Grandmother     Breast cancer Maternal Grandmother 30    Prostate cancer Neg Hx     Uterine cancer Neg Hx        Home Medications:  Prior to Admission medications    Medication Sig Start Date End Date Taking? Authorizing Provider   Etonogestrel (NEXPLANON) 68 MG implant subdermal implant Inject 1 each into the appropriate area of the skin as directed by provider 1 (One) Time.    Provider, Historical, MD        Social History:   Social History     Tobacco Use    Smoking status: Never     Passive exposure: Never    Smokeless tobacco: Never   Vaping Use    Vaping Use: Every day     "Substances: Nicotine    Devices: Disposable   Substance Use Topics    Alcohol use: Never    Drug use: Never         Review of Systems:  Review of Systems   I performed a 10 point review of systems which was all negative, except for the positives found in the HPI above.  Physical Exam:  BP (!) 126/104 (BP Location: Left arm, Patient Position: Sitting)   Pulse 76   Temp 98.7 °F (37.1 °C) (Oral)   Resp 18   Ht 162.6 cm (64\")   Wt 72.5 kg (159 lb 13.3 oz)   LMP 09/16/2023 (Approximate)   SpO2 100%   BMI 27.44 kg/m²     Physical Exam     General: Awake alert and in mild pain distress     HEENT: Head normocephalic atraumatic, TM/canals normal bilaterally, eyes PERRLA EOMI, nose normal, pharyngeal erythema.     Neck: Supple full range of motion, no meningismus, no lymphadenopathy     Heart: Regular rate and rhythm, no murmurs or rubs, 2+ radial pulses bilaterally     Lungs: Clear to auscultation bilaterally without wheezes or crackles, no respiratory distress     Abdomen: Soft, nontender, nondistended, no rebound or guarding     Back exam:  No L-spine tenderness.  No rash.     Skin: Warm, dry, no rash     Musculoskeletal: Normal range of motion, no lower extremity edema     Neurologic: Oriented x3, no motor deficits no sensory deficits     Psychiatric: Mood appears stable, no psychosis          Procedures:  Procedures      Medical Decision Making:      Comorbidities that affect care:    None    External Notes reviewed:          The following orders were placed and all results were independently analyzed by me:  Orders Placed This Encounter   Procedures    Influenza Antigen, Rapid - Swab, Nasopharynx    COVID-19,CEPHEID/KERI,COR/NICK/PAD/MARITA/MAD IN-HOUSE(OR EMERGENT/ADD-ON),NP SWAB IN TRANSPORT MEDIA 3-4 HR TAT, RT-PCR - Swab, Nasopharynx    Rapid Strep A Screen - Swab, Throat    Beta Strep Culture, Throat - Swab, Throat    CT Head Without Contrast    Insert Peripheral IV       Medications Given in the Emergency " Department:  Medications   sodium chloride 0.9 % bolus 1,000 mL (1,000 mL Intravenous New Bag 10/6/23 0153)   metoclopramide (REGLAN) injection 10 mg (10 mg Intravenous Given 10/6/23 0155)   diphenhydrAMINE (BENADRYL) injection 25 mg (25 mg Intravenous Given 10/6/23 0154)   ketorolac (TORADOL) injection 60 mg (60 mg Intramuscular Given 10/6/23 0157)        ED Course:         Labs:    Lab Results (last 24 hours)       Procedure Component Value Units Date/Time    Influenza Antigen, Rapid - Swab, Nasopharynx [[id number]]  (Normal) Collected: 10/05/23 2130    Specimen: Swab from Nasopharynx Updated: 10/05/23 2158     Influenza A Ag, EIA Negative     Influenza B Ag, EIA Negative    COVID-19,CEPHEID/KERI,COR/NICK/PAD/MARITA/MAD IN-HOUSE(OR EMERGENT/ADD-ON),NP SWAB IN TRANSPORT MEDIA 3-4 HR TAT, RT-PCR - Swab, Nasopharynx [[id number]]  (Normal) Collected: 10/05/23 2130    Specimen: Swab from Nasopharynx Updated: 10/05/23 2254     COVID19 Not Detected    Narrative:      Fact sheet for providers: https://www.fda.gov/media/300882/download     Fact sheet for patients: https://www.fda.gov/media/064892/download  Fact sheet for providers: https://www.fda.gov/media/018686/download     Fact sheet for patients: https://www.fda.gov/media/430598/download    Rapid Strep A Screen - Swab, Throat [[id number]]  (Normal) Collected: 10/06/23 0148    Specimen: Swab from Throat Updated: 10/06/23 0216     Strep A Ag Negative    Beta Strep Culture, Throat - Swab, Throat [[id number]] Collected: 10/06/23 0148    Specimen: Swab from Throat Updated: 10/06/23 0216             Imaging:    CT Head Without Contrast    Result Date: 10/6/2023  PROCEDURE: CT HEAD WO CONTRAST  COMPARISON: None.  INDICATIONS: Headache, dizziness.  PROTOCOL:   Standard CT imaging protocol performed.    RADIATION:   Total DLP: 1,017.2 mGy*cm.   MA and/or KV were/was adjusted to minimize radiation dose.    TECHNIQUE: After obtaining the patient's consent, 130 CT images were obtained  without non-ionic intravenous contrast material.  DISCUSSION:   A routine nonenhanced head CT was performed.  No acute brain abnormality is identified.  No acute intracranial hemorrhage.  No acute infarct is seen.  No acute skull fracture.  No midline shift or acute intracranial mass effect is seen.  The ventricular size and configuration are within normal limits.  No significant acute findings are seen with regard to the imaged orbits or middle ear clefts.  Mild-to-moderate age-indeterminate mucosal thickening involves the imaged paranasal sinuses.  No air-fluid interfaces are seen within the imaged paranasal sinuses.        No acute brain abnormality is seen. Specifically, no acute intracranial hemorrhage, no acute infarct, no intracranial mass lesion, and no acute intracranial mass effect are appreciated.  Please see above comments for further detail.    Please note that portions of this note were completed with a voice recognition program.  CASANDRA STEELE JR, MD       Electronically Signed and Approved By: CASANDRA STEELE JR, MD on 10/06/2023 at 3:46                 Differential Diagnosis and Discussion:    Headache: Differential diagnosis includes but is not limited to migraine, cluster headache, hypertension, tumor, subarachnoid bleeding, pseudotumor cerebri, temporal arteritis, infections, tension headache, and TMJ syndrome.  Sore Throat: Differential diagnosis includes but is not limited to bacterial infection, viral infection, inhaled irritants, sinus drainage, thyroiditis, epiglottitis, and retropharyngeal abscess.    All labs were reviewed and interpreted by me.  CT scan radiology impression was interpreted by me.    MDM  Number of Diagnoses or Management Options  Headache, unspecified headache type  Diagnosis management comments: The patient presented to the emergency department with a headache. The patient is now resting comfortably in feels better, is alert, talkative, interactive and in no distress  after ED treatment. The patient appears well and is able to tolerate PO fluids. Repeat examination is unremarkable and benign. The patient is neurologically intact, has a normal mental status, and this ambulatory in the ED. The history, exam, diagnostic testing (if any) and the patient's current condition do not suggest meningitis, stroke, sepsis, subarachnoid hemorrhage, intracranial bleeding, encephalitis, temporal arteritis or other significant pathology to warrant further testing, continued ED treatment, admission, neurological consultation, for other specialist evaluation at this point. The vital signs have been stable. The patient's condition is stable and appropriate for discharge. The patient will pursue further outpatient evaluation with the primary care physician or other designated or consulting physician as indicated in the discharge instructions.       Amount and/or Complexity of Data Reviewed  Clinical lab tests: reviewed and ordered  Tests in the radiology section of CPT®: reviewed and ordered    Risk of Complications, Morbidity, and/or Mortality  Presenting problems: low  Diagnostic procedures: low  Management options: minimal    Patient Progress  Patient progress: improved           Patient Care Considerations:    NARCOTICS: I considered prescribing opiate pain medication as an outpatient, however pain is improved with NSAIDs      Consultants/Shared Management Plan:        Social Determinants of Health:    Patient is independent, reliable, and has access to care.       Disposition and Care Coordination:    Discharged: The patient is suitable and stable for discharge with no need for consideration of observation or admission.    I have explained discharge medications and the need for follow up with the patient/caretakers. This was also printed in the discharge instructions. Patient was discharged with the following medications and follow up:      Medication List      No changes were made to your  prescriptions during this visit.      Lydia Lacy, APRN  [address] [place] [address] [phone]    Call   As needed       Final diagnoses:   Headache, unspecified headache type        ED Disposition       ED Disposition   Discharge    Condition   Stable    Comment   --               This medical record created using voice recognition software.             Ashly Velazquez, APRN  10/06/23 0401

## 2023-10-06 NOTE — DISCHARGE INSTRUCTIONS
Your lab work and CT scan did not show any acute concerning findings.  There may be some sinus congestion.  You can try [place] pot to help relieve the symptoms.  You can also try taking Mucinex or similar decongestant.    Take Tylenol Motrin as needed for pain.    Follow-up with your PCP for further eval.    Return for worsening symptoms or new concerns.

## 2023-10-08 LAB — BACTERIA SPEC AEROBE CULT: NORMAL

## 2023-11-29 ENCOUNTER — TELEPHONE (OUTPATIENT)
Dept: FAMILY MEDICINE CLINIC | Facility: CLINIC | Age: 18
End: 2023-11-29

## 2023-11-29 ENCOUNTER — OFFICE VISIT (OUTPATIENT)
Dept: FAMILY MEDICINE CLINIC | Facility: CLINIC | Age: 18
End: 2023-11-29
Payer: COMMERCIAL

## 2023-11-29 VITALS
DIASTOLIC BLOOD PRESSURE: 85 MMHG | WEIGHT: 142.42 LBS | SYSTOLIC BLOOD PRESSURE: 143 MMHG | BODY MASS INDEX: 24.45 KG/M2 | TEMPERATURE: 98.2 F | OXYGEN SATURATION: 99 % | HEART RATE: 110 BPM

## 2023-11-29 DIAGNOSIS — R68.89 BLOOD PRESSURE ALTERATION: Primary | ICD-10-CM

## 2023-11-29 DIAGNOSIS — R00.2 PALPITATIONS: ICD-10-CM

## 2023-11-29 LAB
ALBUMIN SERPL-MCNC: 5.2 G/DL (ref 3.5–5.2)
ALBUMIN/GLOB SERPL: 1.9 G/DL
ALP SERPL-CCNC: 54 U/L (ref 39–117)
ALT SERPL W P-5'-P-CCNC: 10 U/L (ref 1–33)
ANION GAP SERPL CALCULATED.3IONS-SCNC: 12.9 MMOL/L (ref 5–15)
AST SERPL-CCNC: 15 U/L (ref 1–32)
BASOPHILS # BLD AUTO: 0.04 10*3/MM3 (ref 0–0.2)
BASOPHILS NFR BLD AUTO: 0.6 % (ref 0–1.5)
BILIRUB SERPL-MCNC: 0.4 MG/DL (ref 0–1.2)
BUN SERPL-MCNC: 8 MG/DL (ref 6–20)
BUN/CREAT SERPL: 12.7 (ref 7–25)
CALCIUM SPEC-SCNC: 9.3 MG/DL (ref 8.6–10.5)
CHLORIDE SERPL-SCNC: 104 MMOL/L (ref 98–107)
CO2 SERPL-SCNC: 23.1 MMOL/L (ref 22–29)
CREAT SERPL-MCNC: 0.63 MG/DL (ref 0.57–1)
DEPRECATED RDW RBC AUTO: 41.8 FL (ref 37–54)
EGFRCR SERPLBLD CKD-EPI 2021: 130.4 ML/MIN/1.73
EOSINOPHIL # BLD AUTO: 0.1 10*3/MM3 (ref 0–0.4)
EOSINOPHIL NFR BLD AUTO: 1.6 % (ref 0.3–6.2)
ERYTHROCYTE [DISTWIDTH] IN BLOOD BY AUTOMATED COUNT: 13.4 % (ref 12.3–15.4)
GLOBULIN UR ELPH-MCNC: 2.7 GM/DL
GLUCOSE SERPL-MCNC: 96 MG/DL (ref 65–99)
HCT VFR BLD AUTO: 44.4 % (ref 34–46.6)
HGB BLD-MCNC: 14.7 G/DL (ref 12–15.9)
IMM GRANULOCYTES # BLD AUTO: 0.01 10*3/MM3 (ref 0–0.05)
IMM GRANULOCYTES NFR BLD AUTO: 0.2 % (ref 0–0.5)
LYMPHOCYTES # BLD AUTO: 2.48 10*3/MM3 (ref 0.7–3.1)
LYMPHOCYTES NFR BLD AUTO: 39.8 % (ref 19.6–45.3)
MAGNESIUM SERPL-MCNC: 2.1 MG/DL (ref 1.7–2.2)
MCH RBC QN AUTO: 28.2 PG (ref 26.6–33)
MCHC RBC AUTO-ENTMCNC: 33.1 G/DL (ref 31.5–35.7)
MCV RBC AUTO: 85.2 FL (ref 79–97)
MONOCYTES # BLD AUTO: 0.43 10*3/MM3 (ref 0.1–0.9)
MONOCYTES NFR BLD AUTO: 6.9 % (ref 5–12)
NEUTROPHILS NFR BLD AUTO: 3.17 10*3/MM3 (ref 1.7–7)
NEUTROPHILS NFR BLD AUTO: 50.9 % (ref 42.7–76)
NRBC BLD AUTO-RTO: 0 /100 WBC (ref 0–0.2)
PLATELET # BLD AUTO: 211 10*3/MM3 (ref 140–450)
PMV BLD AUTO: 11.3 FL (ref 6–12)
POTASSIUM SERPL-SCNC: 3.9 MMOL/L (ref 3.5–5.2)
PROT SERPL-MCNC: 7.9 G/DL (ref 6–8.5)
RBC # BLD AUTO: 5.21 10*6/MM3 (ref 3.77–5.28)
SODIUM SERPL-SCNC: 140 MMOL/L (ref 136–145)
T4 FREE SERPL-MCNC: 1.31 NG/DL (ref 0.93–1.7)
TSH SERPL DL<=0.05 MIU/L-ACNC: 2.21 UIU/ML (ref 0.27–4.2)
WBC NRBC COR # BLD AUTO: 6.23 10*3/MM3 (ref 3.4–10.8)

## 2023-11-29 PROCEDURE — 84443 ASSAY THYROID STIM HORMONE: CPT | Performed by: FAMILY MEDICINE

## 2023-11-29 PROCEDURE — 83735 ASSAY OF MAGNESIUM: CPT | Performed by: FAMILY MEDICINE

## 2023-11-29 PROCEDURE — 80053 COMPREHEN METABOLIC PANEL: CPT | Performed by: FAMILY MEDICINE

## 2023-11-29 PROCEDURE — 85025 COMPLETE CBC W/AUTO DIFF WBC: CPT | Performed by: FAMILY MEDICINE

## 2023-11-29 PROCEDURE — 84439 ASSAY OF FREE THYROXINE: CPT | Performed by: FAMILY MEDICINE

## 2023-11-29 NOTE — PROGRESS NOTES
Venipuncture Blood Specimen Collection  Venipuncture performed in left arm by Balwinder Persaud with good hemostasis. Patient tolerated the procedure well without complications.   11/29/23   Balwinder Persaud

## 2023-11-29 NOTE — PROGRESS NOTES
Chief Complaint  Hypertension (Discuss blood pressure )    Subjective      History of Present Illness  Ashleigh Magdaleno is a 18 y.o. female who presents to [facility] with a past medical history of  Past Medical History:   Diagnosis Date    Anxiety     Chlamydia infection during pregnancy 03/03/2022    Febrile seizure     PCOS (polycystic ovarian syndrome)     PFO (patent foramen ovale)      Blood pressure.  Blood pressure is slightly elevated today at 143/85. Yesterday at work she was sitting and felt ida dizzy and she checked her BP and it was 80/60. She felt weird like she was going to pass out. She had someone else check it and it was in the 140s systolic. She has been to the hospital before for panic disorder and has gotten shots for that, but then that ends up dropping her blood pressure real low.    She says she can feel when her BP gets high or low. These episodes happen daily. When she checks it, it usually is low at first, then she rechecks it after 15 minutes and it is high, then after she calms down it normalizes again. She reports she stays well hydrated.    She does have a PFO which was found when she was 13. She follows a Cardiologist in [facility] - haven't seen them in a while and reports she has been unable to get an appointment with them. Had a heart monitor for a month which was inconclusive a year ago. She does have palpitations when the BP gets high. Headaches when the BP gets high - she has a diagnosis of cluster migraines. BP episodes have lasted for about a year for high BP, low Bps for the past 5-6 months, then more back and forth for the past couple weeks.    They initially thought this was due to anxiety or panic attacks, but she doesn't feel like that is affecting her. Not super stressed or panicking now. Feels different than her usual panic attacks.     Last period was one week ago and has the Nexplanon so pregnancy is very unlikely.    Objective    Vital Signs:   Vitals:    11/29/23 1056   BP: 143/85   Pulse: 110   Temp: 98.2 °F (36.8 °C)   SpO2: 99%   Weight: 64.6 kg (142 lb 6.7 oz)     Body mass index is 24.45 kg/m².    Wt Readings from Last 3 Encounters:   11/29/23 64.6 kg (142 lb 6.7 oz)   10/05/23 72.5 kg (159 lb 13.3 oz)   10/05/23 65.8 kg (145 lb)     BP Readings from Last 3 Encounters:   11/29/23 143/85   10/05/23 (!) 126/104   10/05/23 122/83       Health Maintenance   Topic Date Due    COVID-19 Vaccine (1) Never done    HPV VACCINES (2 - 3-dose series) 03/07/2019    ANNUAL PHYSICAL  Never done    CHLAMYDIA SCREENING  04/05/2023    INFLUENZA VACCINE  Never done    TDAP/TD VACCINES (2 - Td or Tdap) 09/27/2023    HEPATITIS C SCREENING  Completed    MENINGOCOCCAL VACCINE  Completed    Pneumococcal Vaccine 0-64  Aged Out       Physical Exam  Vitals and nursing note reviewed.   Constitutional:       General: She is not in acute distress.     Appearance: Normal appearance.   Neck:      Vascular: No carotid bruit.   Cardiovascular:      Rate and Rhythm: Normal rate and regular rhythm.      Heart sounds: Normal heart sounds.   Pulmonary:      Effort: Pulmonary effort is normal. No respiratory distress.      Breath sounds: Normal breath sounds. No wheezing.   Musculoskeletal:      Cervical back: No rigidity.   Skin:     General: Skin is warm and dry.   Neurological:      General: No focal deficit present.      Mental Status: She is alert.   Psychiatric:         Mood and Affect: Mood normal.         Behavior: Behavior normal.            Result Review :  The following data was reviewed by: Tarik Verdugo MD on 11/29/2023:    Ferritin (03/06/2023 13:13)  Iron Profile (03/06/2023 13:13)  Comprehensive Metabolic Panel (03/06/2023 13:13)  CBC & Differential (03/06/2023 13:13)  Vitamin B12 & Folate (03/06/2023 13:13)  TSH (03/06/2023 13:13)  ECG 12 Lead Chest Pain (01/18/2023 17:14)       Procedures        Assessment and Plan   Diagnoses and all orders for  this visit:    1. Blood pressure alteration (Primary)  -     Cancel: Ambulatory Referral to Cardiology  -     Magnesium  -     TSH+Free T4  -     CBC & Differential  -     Adult Transthoracic Echo Complete W/ Cont if Necessary Per Protocol; Future  -     Ambulatory Referral to Cardiology    2. Palpitations  -     Cancel: Ambulatory Referral to Cardiology  -     Comprehensive Metabolic Panel  -     Ambulatory Referral to Cardiology    She reports she needs a new cardiologist as she cannot get into see her previous cardiologist, therefore I have placed a referral.  We will obtain some labs and an echocardiogram to check for any causes of her palpitations and labile blood pressure.    Call or be seen immediately by a provider there any worsening or new symptoms such as chest pain.     Body mass index is 24.45 kg/m².           FOLLOW UP  Return in about 1 month (around 12/29/2023), or if symptoms worsen or fail to improve.  Patient was given instructions and counseling regarding her condition or for health maintenance advice. Please see specific information pulled into the AVS if appropriate.       Tarik Verdugo MD  11/29/23  12:38 EST    CURRENT & DISCONTINUED MEDICATIONS  Current Outpatient Medications   Medication Instructions    Etonogestrel (NEXPLANON) 68 MG implant subdermal implant 1 each, Intradermal, Once       There are no discontinued medications.

## 2023-12-04 ENCOUNTER — OFFICE VISIT (OUTPATIENT)
Dept: CARDIOLOGY | Facility: CLINIC | Age: 18
End: 2023-12-04
Payer: MEDICAID

## 2023-12-04 VITALS
WEIGHT: 143.23 LBS | SYSTOLIC BLOOD PRESSURE: 110 MMHG | HEIGHT: 64 IN | HEART RATE: 77 BPM | DIASTOLIC BLOOD PRESSURE: 72 MMHG | BODY MASS INDEX: 24.45 KG/M2

## 2023-12-04 DIAGNOSIS — R00.2 PALPITATIONS: ICD-10-CM

## 2023-12-04 DIAGNOSIS — R07.89 CHEST PAIN, ATYPICAL: ICD-10-CM

## 2023-12-04 DIAGNOSIS — R55 SYNCOPE AND COLLAPSE: ICD-10-CM

## 2023-12-04 DIAGNOSIS — I99.8 FLUCTUATING BLOOD PRESSURE: Primary | ICD-10-CM

## 2023-12-04 DIAGNOSIS — Q21.12 PFO (PATENT FORAMEN OVALE): ICD-10-CM

## 2023-12-04 PROCEDURE — 99204 OFFICE O/P NEW MOD 45 MIN: CPT | Performed by: INTERNAL MEDICINE

## 2023-12-04 NOTE — PROGRESS NOTES
"Chief Complaint  Hypertension, Palpitations, Dizziness, and Migraine      History of Present Illness  Ashleigh Magdaleno presents to [facility]    This is a very pleasant 18-year-old lady with past medical history as outlined below, remarkable for anxiety/panic attacks, patent foramen ovale which was an incidental finding on previous echo, presents to clinic for cardiac evaluation.  She complains of fluctuating blood pressure readings.  Her blood pressure would drop to the 80s systolic before \"shoots up\" to the 140s.  She feels tired and dizzy during these episodes.  She gets these episodes on regular basis and usually happen more than once a day.  In addition, she complains of postural dizziness with presyncope and syncope.  Her most recent syncopal episode was last Thursday and it happened after she stood up.  She felt dizzy and unsteady before passing out.  She denies loss of urine or stool control.  She had 7 or 8 syncopal episodes this year.  She has occasional palpitations associated with jabbing chest discomfort.  Previous cardiac work-up demonstrated incidental PFO but was otherwise unremarkable.  She has no other complaints today.  She remains well-hydrated and drinks about 12 bottles of water every day.  She has not been very physically active lately.    Past Medical History:   Diagnosis Date    Anxiety     Chlamydia infection during pregnancy 03/03/2022    Febrile seizure     PCOS (polycystic ovarian syndrome)     PFO (patent foramen ovale)          Current Outpatient Medications:     Etonogestrel (NEXPLANON) 68 MG implant subdermal implant, Inject 1 each into the appropriate area of the skin as directed by provider 1 (One) Time., Disp: , Rfl:     There are no discontinued medications.  Allergies   Allergen Reactions    Buspirone Dizziness and Other (See Comments)     dizziness        Social History     Tobacco Use    Smoking status: Never     Passive exposure: Never    " "Smokeless tobacco: Never   Vaping Use    Vaping Use: Every day    Substances: Nicotine    Devices: Disposable   Substance Use Topics    Alcohol use: Never    Drug use: Never       Family History   Problem Relation Age of Onset    No Known Problems Father     No Known Problems Mother     Ovarian cancer Maternal Grandmother     Colon cancer Maternal Grandmother     Breast cancer Maternal Grandmother 30    Prostate cancer Neg Hx     Uterine cancer Neg Hx         Objective     /72   Pulse 77   Ht 162.6 cm (64.02\")   Wt 65 kg (143 lb 3.7 oz)   BMI 24.57 kg/m²       Physical Exam  Constitutional:       General: Awake. Not in acute distress.     Appearance: Normal appearance.   Neck:      Vascular: No carotid bruit, hepatojugular reflux or JVD.   Cardiovascular:      Rate and Rhythm: Normal rate and regular rhythm.      Chest Wall: PMI is not displaced.      Heart sounds: Normal heart sounds, S1 normal and S2 normal. No murmur heard.   No friction rub. No gallop. No S3 or S4 sounds.    Pulmonary:      Effort: Pulmonary effort is normal.      Breath sounds: Normal breath sounds. No wheezing, rhonchi or rales.   Ext.      No edema.   Skin:     General: Skin is warm and dry.      Coloration: Skin is not cyanotic.      Findings: No petechiae or rash.   Neurological:      Mental Status: Alert and oriented x 3  Psychiatric:         Behavior: Behavior is cooperative.       Result Review :     No results found for: \"PROBNP\"  CMP          3/6/2023    13:13 11/29/2023    11:28   CMP   Glucose 77  96    BUN 7  8    Creatinine 0.47  0.63    EGFR 140.0  130.4    Sodium 138  140    Potassium 4.4  3.9    Chloride 105  104    Calcium 9.8  9.3    Total Protein 7.4  7.9    Albumin 4.6  5.2    Globulin 2.8  2.7    Total Bilirubin 0.2  0.4    Alkaline Phosphatase 53  54    AST (SGOT) 16  15    ALT (SGPT) 15  10    Albumin/Globulin Ratio 1.6  1.9    BUN/Creatinine Ratio 14.9  12.7    Anion Gap 8.0  12.9      CBC w/diff          " "3/6/2023    13:13 11/29/2023    11:28   CBC w/Diff   WBC 4.89  6.23    RBC 4.94  5.21    Hemoglobin 12.9  14.7    Hematocrit 40.0  44.4    MCV 81.0  85.2    MCH 26.1  28.2    MCHC 32.3  33.1    RDW 14.3  13.4    Platelets 223  211    Neutrophil Rel % 52.0  50.9    Immature Granulocyte Rel % 0.2  0.2    Lymphocyte Rel % 36.6  39.8    Monocyte Rel % 9.0  6.9    Eosinophil Rel % 1.8  1.6    Basophil Rel % 0.4  0.6       Lab Results   Component Value Date    TSH 2.210 11/29/2023      Lab Results   Component Value Date    FREET4 1.31 11/29/2023      No results found for: \"DDIMERQUANT\"  Magnesium   Date Value Ref Range Status   11/29/2023 2.1 1.7 - 2.2 mg/dL Final      No results found for: \"DIGOXIN\"   Lab Results   Component Value Date    TROPONINT <0.010 03/15/2022      No results found for: \"POCTROP\"(                   No results found for this or any previous visit.                Diagnoses and all orders for this visit:    1. Fluctuating blood pressure (Primary)  -     24 Hour Blood Pressure Monitor; Future    2. Palpitations  -     Holter Monitor - 48 Hour; Future  -     Adult Transthoracic Echo Complete W/ Cont if Necessary Per Protocol; Future  -     Treadmill Stress Test; Future    3. PFO (patent foramen ovale)    4. Syncope and collapse  -     Treadmill Stress Test; Future  -     Tilt Table; Future    5. Chest pain, atypical      Assessment: Patient has been having fluctuating blood pressure readings as described in HPI.  In addition, she complains of palpitations associated with sharp chest discomfort.  She has postural dizziness with occasional presyncope and syncope.  She had a total of 7-8 syncopal episodes this year.  Her exam today is unremarkable.  Most recent ECG was reviewed and shows normal sinus rhythm with normal intervals.  The patient was advised to remain well-hydrated and to liberate salt intake.  Thigh-high compression stockings were recommended.  I will check a 24-hour ambulatory blood pressure " to get a better idea about her blood pressure readings.  Echocardiogram will be done to assess LVEF, and valvular function.  Treadmill stress test will be done to rule out exercise-induced arrhythmias.  48-hour Holter monitor will be done to assess for ectopy and tachyarrhythmias.  Tilt table study will be done to assess for neurocardiogenic syncope.  No specific intervention appears to be indicated in regard to her PFO.  Further recommendations to follow.    Follow Up       Return for Return to clinic after diagnostic testing, With Fransisca FREEMAN.    Patient was given instructions and counseling regarding her condition or for health maintenance advice. Please see specific information pulled into the AVS if appropriate.

## 2023-12-08 ENCOUNTER — PATIENT ROUNDING (BHMG ONLY) (OUTPATIENT)
Dept: CARDIOLOGY | Facility: CLINIC | Age: 18
End: 2023-12-08
Payer: MEDICAID

## 2023-12-08 NOTE — PROGRESS NOTES
**A [employer] message has been sent fot PATIENT ROUNDING with [facility] .

## 2023-12-12 ENCOUNTER — OFFICE VISIT (OUTPATIENT)
Dept: FAMILY MEDICINE CLINIC | Facility: CLINIC | Age: 18
End: 2023-12-12

## 2023-12-12 VITALS
OXYGEN SATURATION: 99 % | WEIGHT: 138.67 LBS | BODY MASS INDEX: 23.79 KG/M2 | HEART RATE: 80 BPM | DIASTOLIC BLOOD PRESSURE: 68 MMHG | TEMPERATURE: 98.1 F | SYSTOLIC BLOOD PRESSURE: 108 MMHG

## 2023-12-12 DIAGNOSIS — J02.9 SORE THROAT: Primary | ICD-10-CM

## 2023-12-12 DIAGNOSIS — J06.9 ACUTE URI: ICD-10-CM

## 2023-12-12 LAB
EXPIRATION DATE: NORMAL
INTERNAL CONTROL: NORMAL
Lab: NORMAL
S PYO AG THROAT QL: NEGATIVE

## 2023-12-12 PROCEDURE — 87880 STREP A ASSAY W/OPTIC: CPT | Performed by: FAMILY MEDICINE

## 2023-12-12 PROCEDURE — 99213 OFFICE O/P EST LOW 20 MIN: CPT | Performed by: FAMILY MEDICINE

## 2023-12-12 NOTE — PROGRESS NOTES
Chief Complaint  Sore Throat (Sore throat )    Subjective      Sore Throat       Ashleigh Magdaleno is a 18 y.o. female who presents to [facility] with a past medical history of  Past Medical History:   Diagnosis Date    Anxiety     Chlamydia infection during pregnancy 03/03/2022    Febrile seizure     PCOS (polycystic ovarian syndrome)     PFO (patent foramen ovale)      Sore throat. Left throat pain, feels like she has a knot there. Fatigue as well. Going on for about 3 days. No fever or chills. No rhinorrhea or congestion. No cough. No dyspnea but feels like the throat is a little closed off when she had tonsils.    She just visited the dentist and was told she had a tooth abscess so they are sending in amoxicillin for that.     Her daughter just tested negative for strep but positive for rhinovirus and RSV.    Objective   Vital Signs:   Vitals:    12/12/23 1534   BP: 108/68   Pulse: 80   Temp: 98.1 °F (36.7 °C)   SpO2: 99%   Weight: 62.9 kg (138 lb 10.7 oz)     Body mass index is 23.79 kg/m².    Wt Readings from Last 3 Encounters:   12/12/23 62.9 kg (138 lb 10.7 oz)   12/04/23 65 kg (143 lb 3.7 oz)   11/29/23 64.6 kg (142 lb 6.7 oz)     BP Readings from Last 3 Encounters:   12/12/23 108/68   12/04/23 110/72   11/29/23 143/85       Health Maintenance   Topic Date Due    COVID-19 Vaccine (1) Never done    HPV VACCINES (2 - 3-dose series) 03/07/2019    ANNUAL PHYSICAL  Never done    CHLAMYDIA SCREENING  04/05/2023    INFLUENZA VACCINE  Never done    TDAP/TD VACCINES (2 - Td or Tdap) 09/27/2023    HEPATITIS C SCREENING  Completed    MENINGOCOCCAL VACCINE  Completed    Pneumococcal Vaccine 0-64  Aged Out       Physical Exam  Vitals and nursing note reviewed.   Constitutional:       General: She is not in acute distress.     Appearance: Normal appearance.   HENT:      Head: Normocephalic and atraumatic.      Right Ear: Tympanic membrane and ear canal normal. There is no impacted  cerumen.      Left Ear: Tympanic membrane and ear canal normal. There is no impacted cerumen.      Nose: Rhinorrhea present.      Mouth/Throat:      Pharynx: Posterior oropharyngeal erythema present. No oropharyngeal exudate.   Cardiovascular:      Rate and Rhythm: Normal rate and regular rhythm.      Heart sounds: No murmur heard.  Pulmonary:      Effort: Pulmonary effort is normal. No respiratory distress.      Breath sounds: Normal breath sounds. No wheezing.   Musculoskeletal:      Cervical back: No rigidity or tenderness.   Lymphadenopathy:      Cervical: No cervical adenopathy.   Skin:     General: Skin is warm and dry.   Neurological:      Mental Status: She is alert.   Psychiatric:         Mood and Affect: Mood normal.         Behavior: Behavior normal.            Result Review :  The following data was reviewed by: Tarik Verdugo MD on 12/12/2023:                           Lab Results   Lab 12/12/23  1546   RAPID STREP A SCREEN Negative                                      Procedures        Assessment and Plan   Diagnoses and all orders for this visit:    1. Sore throat (Primary)  -     POCT rapid strep A    2. Acute URI    Swabs negative in office. Advised supportive measures such as hydration and rest. Can do OTC supportive medications such as an antihistamine or decongestant if needed. Honey can be soothing to a sore throat. Return if new or worsening symptoms.    BMI is within normal parameters. No other follow-up for BMI required.         FOLLOW UP  Return if symptoms worsen or fail to improve.  Patient was given instructions and counseling regarding her condition or for health maintenance advice. Please see specific information pulled into the AVS if appropriate.       Tarik Verdugo MD  12/12/23  15:55 EST    CURRENT & DISCONTINUED MEDICATIONS  Current Outpatient Medications   Medication Instructions    Etonogestrel (NEXPLANON) 68 MG implant subdermal implant 1 each, Intradermal, Once        There are no discontinued medications.

## 2023-12-13 ENCOUNTER — TELEPHONE (OUTPATIENT)
Dept: CARDIOLOGY | Facility: CLINIC | Age: 18
End: 2023-12-13
Payer: MEDICAID

## 2023-12-13 ENCOUNTER — TELEPHONE (OUTPATIENT)
Dept: CARDIOLOGY | Facility: CLINIC | Age: 18
End: 2023-12-13

## 2023-12-13 NOTE — TELEPHONE ENCOUNTER
Hub staff attempted to follow warm transfer process and was unsuccessful     Caller: Ashleigh Magdaleno    Relationship to patient: Self    Best call back number: [phone]    Patient is needing: PATIENT IS REQUESTING TO BE RESCHEDULED FOR HER ECHO THAT WAS SCHEDULED FOR THIS MORNING. SHE WAS NOTED AS A NO SHOW.

## 2023-12-13 NOTE — TELEPHONE ENCOUNTER
REQUEST FOR CARDIAC CLEARANCE    Caller name: Ashleigh Magdaleno     Phone Number: [phone]    Surgeon's name: DR [facility]     Type of planned surgery: TOOTH EXTRACTION    Date of planned surgery: IT WAS PLANNED FOR YESTERDAY BUT THEY ARE CONCERNED ABOUT THE PATIENT'S BLOOD PRESSURE.     Type of anesthesia: NA    Have you been experiencing chest pain or shortness of breath? NO    Is your doctor requesting for you to stop any of your medications prior to your surgery? NO    Where should we fax the clearance to? PLEASE FAX CLEARANCE TO: [phone]

## 2023-12-13 NOTE — TELEPHONE ENCOUNTER
Called pt and let her know the ECHO will be done at the hosp. Provided her hosp scheduling dept phone number.

## 2023-12-14 ENCOUNTER — HOSPITAL ENCOUNTER (EMERGENCY)
Facility: HOSPITAL | Age: 18
Discharge: HOME | End: 2023-12-14
Attending: EMERGENCY MEDICINE
Payer: MEDICAID

## 2023-12-14 VITALS
HEART RATE: 77 BPM | DIASTOLIC BLOOD PRESSURE: 81 MMHG | OXYGEN SATURATION: 96 % | RESPIRATION RATE: 16 BRPM | SYSTOLIC BLOOD PRESSURE: 122 MMHG | TEMPERATURE: 97.8 F

## 2023-12-14 DIAGNOSIS — K04.7 DENTAL INFECTION: Primary | ICD-10-CM

## 2023-12-14 DIAGNOSIS — K08.89 PAIN, DENTAL: ICD-10-CM

## 2023-12-14 PROCEDURE — 99283 EMERGENCY DEPT VISIT LOW MDM: CPT

## 2023-12-14 RX ORDER — AMOXICILLIN 875 MG/1
875 TABLET, COATED ORAL ONCE
Status: COMPLETED | OUTPATIENT
Start: 2023-12-14 | End: 2023-12-14

## 2023-12-14 RX ORDER — LIDOCAINE HYDROCHLORIDE 20 MG/ML
5 SOLUTION OROPHARYNGEAL AS NEEDED
Qty: 100 ML | Refills: 0 | Status: SHIPPED | OUTPATIENT
Start: 2023-12-14

## 2023-12-14 RX ORDER — TRAMADOL HYDROCHLORIDE 50 MG/1
50 TABLET ORAL ONCE
Status: COMPLETED | OUTPATIENT
Start: 2023-12-14 | End: 2023-12-14

## 2023-12-14 RX ORDER — DICLOFENAC SODIUM 75 MG/1
75 TABLET, DELAYED RELEASE ORAL 2 TIMES DAILY PRN
Qty: 20 TABLET | Refills: 0 | Status: SHIPPED | OUTPATIENT
Start: 2023-12-14

## 2023-12-14 RX ORDER — AMOXICILLIN 875 MG/1
875 TABLET, COATED ORAL 2 TIMES DAILY
Qty: 20 TABLET | Refills: 0 | Status: SHIPPED | OUTPATIENT
Start: 2023-12-14 | End: 2023-12-24

## 2023-12-14 RX ADMIN — TRAMADOL HYDROCHLORIDE 50 MG: 50 TABLET ORAL at 19:44

## 2023-12-14 RX ADMIN — AMOXICILLIN 875 MG: 875 TABLET, FILM COATED ORAL at 19:45

## 2023-12-14 RX ADMIN — BENZOCAINE 1 APPLICATION: 200 GEL DENTAL; ORAL; PERIODONTAL at 19:46

## 2023-12-14 NOTE — TELEPHONE ENCOUNTER
Procedure: Tooth Extraction (local Anesthesia)    Medication Directive: NA    PMH: Fluctuating BP, PFO, Syncope and collapse, CP    Last Seen: 12/04/23    Patient has an abscess and needs to have too removed as soon as possible.     HOLTER< ECHO< TREADMILL have been ordered.

## 2023-12-14 NOTE — TELEPHONE ENCOUNTER
Attempted to call patient. Left VM stating we will not be able to send cardiac clearance request until we have more information regarding blood pressure issues.

## 2023-12-15 NOTE — ED PROVIDER NOTES
Time: 7:04 PM EST  Date of encounter:  12/14/2023  Independent Historian/Clinical History and Information was obtained by:   Patient    History is limited by: N/A    Chief Complaint: Dental pain      History of Present Illness:  Patient is a 18 y.o. year old female who presents to the emergency department for evaluation of dental pain for the past few weeks.  Patient was having pain in her left lower tooth for the last few weeks.  She went to the dentist last week and was told she had an abscess they did not put her on medicine because they wanted to fix it the next day but they wanted her to get seen by her cardiologist to get cleared since she has a history of PFO.  She finally was cleared today but they cannot see her till next Tuesday and she was having continued pain.  Patient states it radiates into the left side of her neck.  No fever.  No oral swelling.  No drainage.  Patient states she took Tylenol 1 time and it did not seem to help with her pain so she has not taken it or anything else ever since    [facility]    Patient Care Team  Primary Care Provider: Lydia Lacy APRN    Past Medical History:     Allergies   Allergen Reactions    Buspirone Dizziness and Other (See Comments)     dizziness     Past Medical History:   Diagnosis Date    Anxiety     Chlamydia infection during pregnancy 03/03/2022    Febrile seizure     PCOS (polycystic ovarian syndrome)     PFO (patent foramen ovale)      Past Surgical History:   Procedure Laterality Date    ADENOIDECTOMY      APPENDECTOMY      LIPOMA EXCISION      foot    TONSILLECTOMY       Family History   Problem Relation Age of Onset    No Known Problems Father     No Known Problems Mother     Ovarian cancer Maternal Grandmother     Colon cancer Maternal Grandmother     Breast cancer Maternal Grandmother 30    Prostate cancer Neg Hx     Uterine cancer Neg Hx        Home Medications:  Prior to Admission medications    Medication Sig Start Date End Date Taking?  Authorizing Provider   Etonogestrel (NEXPLANON) 68 MG implant subdermal implant Inject 1 each into the appropriate area of the skin as directed by provider 1 (One) Time.    Provider, MD Concepción        Social History:   Social History     Tobacco Use    Smoking status: Never     Passive exposure: Never    Smokeless tobacco: Never   Vaping Use    Vaping Use: Every day    Substances: Nicotine    Devices: Disposable   Substance Use Topics    Alcohol use: Never    Drug use: Never         Review of Systems:  Review of Systems   Constitutional:  Negative for chills and fever.   HENT:  Positive for dental problem. Negative for ear discharge, ear pain, facial swelling, trouble swallowing and voice change.    Gastrointestinal:  Negative for vomiting.   Musculoskeletal:  Positive for neck pain (Left side up neck).   Skin:  Negative for rash.   Neurological:  Negative for headaches.   Hematological:  Negative for adenopathy.   Psychiatric/Behavioral: Negative.          Physical Exam:  /81 (BP Location: Left arm, Patient Position: Sitting)   Pulse 77   Temp 97.8 °F (36.6 °C) (Oral)   Resp 16   LMP  (LMP Unknown)   SpO2 96%     Physical Exam  Vitals and nursing note reviewed.   Constitutional:       General: She is not in acute distress.     Appearance: Normal appearance. She is not toxic-appearing.   HENT:      Head: Normocephalic and atraumatic.      Right Ear: Tympanic membrane, ear canal and external ear normal.      Left Ear: Tympanic membrane, ear canal and external ear normal.      Nose: Nose normal.      Mouth/Throat:      Mouth: Mucous membranes are moist.      Pharynx: No posterior oropharyngeal erythema.      Comments: Tenderness at left lower second molar with gingival erythema.  No drainable abscess noted.  No Ludewig's angina  Eyes:      General: No scleral icterus.     Conjunctiva/sclera: Conjunctivae normal.   Cardiovascular:      Rate and Rhythm: Normal rate and regular rhythm.      Heart sounds:  Normal heart sounds.   Pulmonary:      Effort: Pulmonary effort is normal. No respiratory distress.      Breath sounds: Normal breath sounds.   Abdominal:      Tenderness: There is no abdominal tenderness.   Musculoskeletal:         General: Normal range of motion.      Cervical back: Normal range of motion and neck supple.   Lymphadenopathy:      Cervical: No cervical adenopathy.   Skin:     General: Skin is warm and dry.      Findings: No rash.   Neurological:      Mental Status: She is alert and oriented to person, place, and time.   Psychiatric:         Mood and Affect: Mood normal.         Behavior: Behavior normal.                Medical Decision Making:      Comorbidities that affect care:    PFO, PCOS    External Notes reviewed:    Previous Clinic Note: Patient seen 2 days ago for sore throat and acute URI .  No medications were given      The following orders were placed and all results were independently analyzed by me:  No orders of the defined types were placed in this encounter.      Medications Given in the Emergency Department:  Medications   benzocaine (HURRICAINE/ORAJEL) 20 % jelly (1 application  Mouth/Throat Given 12/14/23 1946)   traMADol (ULTRAM) tablet 50 mg (50 mg Oral Given 12/14/23 1944)   amoxicillin (AMOXIL) tablet 875 mg (875 mg Oral Given 12/14/23 1945)        ED Course:         Labs:    Lab Results (last 24 hours)       ** No results found for the last 24 hours. **             Imaging:    No Radiology Exams Resulted Within Past 24 Hours      Differential Diagnosis and Discussion:    Dental Pain: Differential diagnosis includes but is not limited to dental caries, periodontitis, pericoronitis, peridental abscess, gingival abscess, apthous stomatitis, allergic stomatitis, acute necrotizing ulcerative gingivitis, herpetic stomatitis.        MDM  Number of Diagnoses or Management Options  Diagnosis management comments: I have explained the patient´s condition, diagnoses and treatment plan  based on the information available to me at this time. I have answered questions and addressed any concerns. The patient has a good  understanding of the patient´s diagnosis, condition, and treatment plan as can be expected at this point. The vital signs have been stable. The patient´s condition is stable and appropriate for discharge from the emergency department.      The patient will pursue further outpatient evaluation with the primary care physician or other designated or consulting physician as outlined in the discharge instructions. They are agreeable to this plan of care and follow-up instructions have been explained in detail. The patient has received these instructions in written format and have expressed an understanding of the discharge instructions. The patient is aware that any significant change in condition or worsening of symptoms should prompt an immediate return to this or the closest emergency department or call to 911.       Amount and/or Complexity of Data Reviewed  Tests in the medicine section of CPT®: ordered and reviewed    Risk of Complications, Morbidity, and/or Mortality  Presenting problems: low  Management options: low    Patient Progress  Patient progress: stable         Patient Care Considerations:    I considered a soft tissue neck CT but patient has no significant swelling or concerns for soft tissue abscess or infection LABS: I considered ordering labs, however patient has no significant signs of infection and no concern for generalized sepsis      Consultants/Shared Management Plan:    None    Social Determinants of Health:    Patient has presented with family members who are responsible, reliable and will ensure follow up care.      Disposition and Care Coordination:    Discharged: The patient is suitable and stable for discharge with no need for consideration of observation or admission.    I have explained the patient´s condition, diagnoses and treatment plan based on the  information available to me at this time. I have answered questions and addressed any concerns. The patient has a good  understanding of the patient´s diagnosis, condition, and treatment plan as can be expected at this point. The vital signs have been stable. The patient´s condition is stable and appropriate for discharge from the emergency department.      The patient will pursue further outpatient evaluation with the primary care physician or other designated or consulting physician as outlined in the discharge instructions. They are agreeable to this plan of care and follow-up instructions have been explained in detail. The patient has received these instructions in written format and have expressed an understanding of the discharge instructions. The patient is aware that any significant change in condition or worsening of symptoms should prompt an immediate return to this or the closest emergency department or call to 911.  I have explained discharge medications and the need for follow up with the patient/caretakers. This was also printed in the discharge instructions. Patient was discharged with the following medications and follow up:      Medication List        New Prescriptions      amoxicillin 875 MG tablet  Commonly known as: AMOXIL  Take 1 tablet by mouth 2 (Two) Times a Day for 10 days.     diclofenac 75 MG EC tablet  Commonly known as: VOLTAREN  Take 1 tablet by mouth 2 (Two) Times a Day As Needed (pain).     Lidocaine Viscous HCl 2 % solution  Commonly known as: XYLOCAINE  Take 5 mL by mouth As Needed for Mild Pain. Saturate gauze or cotton ball and apply to affected tooth as needed for pain               Where to Get Your Medications        These medications were sent to [facility]/pharmacy #[id number] - [place], [place] - [address] - [phone] [facility] [phone]   [address] [place] [address]      Hours: 24-hours Phone: [phone]   amoxicillin 875 MG tablet  diclofenac 75 MG EC  tablet  Lidocaine Viscous HCl 2 % solution      Your dentist    On 12/19/2023  As scheduled       Final diagnoses:   Dental infection   Pain, dental        ED Disposition       ED Disposition   Discharge    Condition   Stable    Comment   --               This medical record created using voice recognition software.             Heather Griffith, PETE  12/14/23 2030

## 2023-12-15 NOTE — DISCHARGE INSTRUCTIONS
Take medications as prescribed.    Oral rinses with half-strength peroxide or warm salt water 3-4 times a day.    Ice to jaw for pain.    Follow-up with your dentist.    Return for new or worsening symptoms

## 2024-04-20 ENCOUNTER — HOSPITAL ENCOUNTER (EMERGENCY)
Facility: HOSPITAL | Age: 19
Discharge: HOME | End: 2024-04-20
Attending: EMERGENCY MEDICINE
Payer: MEDICAID

## 2024-04-20 ENCOUNTER — APPOINTMENT (OUTPATIENT)
Dept: GENERAL RADIOLOGY | Facility: HOSPITAL | Age: 19
End: 2024-04-20
Payer: MEDICAID

## 2024-04-20 VITALS
BODY MASS INDEX: 21.72 KG/M2 | DIASTOLIC BLOOD PRESSURE: 75 MMHG | TEMPERATURE: 98.9 F | HEIGHT: 64 IN | HEART RATE: 84 BPM | SYSTOLIC BLOOD PRESSURE: 121 MMHG | WEIGHT: 127.21 LBS | OXYGEN SATURATION: 100 % | RESPIRATION RATE: 18 BRPM

## 2024-04-20 DIAGNOSIS — S50.12XA CONTUSION OF LEFT FOREARM, INITIAL ENCOUNTER: Primary | ICD-10-CM

## 2024-04-20 PROCEDURE — 99283 EMERGENCY DEPT VISIT LOW MDM: CPT

## 2024-04-20 PROCEDURE — 73090 X-RAY EXAM OF FOREARM: CPT

## 2024-04-20 RX ORDER — GINSENG 100 MG
1 CAPSULE ORAL ONCE
Status: COMPLETED | OUTPATIENT
Start: 2024-04-20 | End: 2024-04-20

## 2024-04-20 RX ORDER — IBUPROFEN 600 MG/1
600 TABLET ORAL ONCE
Status: DISCONTINUED | OUTPATIENT
Start: 2024-04-20 | End: 2024-04-20 | Stop reason: HOSPADM

## 2024-04-20 RX ADMIN — BACITRACIN 0.9 G: 500 OINTMENT TOPICAL at 04:52

## 2024-04-20 NOTE — Clinical Note
[facility] EMERGENCY ROOM  [address] [place] [address]  Phone: [phone]  Fax: [phone]    Ashleigh Magdaleno was seen and treated in our emergency department on 4/20/2024.  She may return to work on 04/21/2024.         Thank you for choosing [facility].    Heather Griffith APRN

## 2024-04-20 NOTE — DISCHARGE INSTRUCTIONS
Your x-ray was negative for any acute bony abnormality.    Keep abrasion clean and dry wash with soap and water and apply topical antibiotic.    Ice to affected area.  Keep it elevated to decrease swelling.    May Ace wrap for comfort.    Alternate Tylenol and Motrin for pain

## 2024-04-20 NOTE — ED PROVIDER NOTES
Time: 4:28 AM EDT  Date of encounter:  4/20/2024  Independent Historian/Clinical History and Information was obtained by:   Patient    History is limited by: N/A    Chief Complaint: Arm injury      History of Present Illness:  Patient is a 19 y.o. year old female who presents to the emergency department for evaluation of left arm injury.  Patient is working at the [employer] and they were standing there when an employee who is new accidentally turned on the valve and there was a pressure hose that had already been connected from 1 end and it started flapping around due to air and it smacked her in the arm.  Patient had small abrasion.  She states it feels burning and her arm is sore to touch.  Patient is right-handed.  Rates the pain 5 out of 10.  Mild swelling    HPI    Patient Care Team  Primary Care Provider: Lydia Lacy APRN    Past Medical History:     Allergies   Allergen Reactions    Buspirone Dizziness and Other (See Comments)     dizziness     Past Medical History:   Diagnosis Date    Anxiety     Chlamydia infection during pregnancy 03/03/2022    Febrile seizure     PCOS (polycystic ovarian syndrome)     PFO (patent foramen ovale)      Past Surgical History:   Procedure Laterality Date    ADENOIDECTOMY      APPENDECTOMY      LIPOMA EXCISION      foot    TONSILLECTOMY       Family History   Problem Relation Age of Onset    No Known Problems Father     No Known Problems Mother     Ovarian cancer Maternal Grandmother     Colon cancer Maternal Grandmother     Breast cancer Maternal Grandmother 30    Prostate cancer Neg Hx     Uterine cancer Neg Hx        Home Medications:  Prior to Admission medications    Medication Sig Start Date End Date Taking? Authorizing Provider   diclofenac (VOLTAREN) 75 MG EC tablet Take 1 tablet by mouth 2 (Two) Times a Day As Needed (pain). 12/14/23   Heather Griffith APRN   Etonogestrel (NEXPLANON) 68 MG implant subdermal implant Inject 1 each into the appropriate area of  "the skin as directed by provider 1 (One) Time.    Provider, MD Concepción   Lidocaine Viscous HCl (XYLOCAINE) 2 % solution Take 5 mL by mouth As Needed for Mild Pain. Saturate gauze or cotton ball and apply to affected tooth as needed for pain 12/14/23   Heather Griffith APRN        Social History:   Social History     Tobacco Use    Smoking status: Never     Passive exposure: Never    Smokeless tobacco: Never   Vaping Use    Vaping status: Every Day    Substances: Nicotine    Devices: Disposable   Substance Use Topics    Alcohol use: Never    Drug use: Never         Review of Systems:  Review of Systems   Musculoskeletal:  Positive for arthralgias (Left forearm).   Skin:  Positive for wound (Abrasion left mid forearm).   Neurological:  Negative for weakness and numbness.   Hematological: Negative.    Psychiatric/Behavioral: Negative.     All other systems reviewed and are negative.       Physical Exam:  /75   Pulse 84   Temp 98.9 °F (37.2 °C)   Resp 18   Ht 162.6 cm (64.02\")   Wt 57.7 kg (127 lb 3.3 oz)   SpO2 100%   BMI 21.82 kg/m²     Physical Exam  Vitals and nursing note reviewed.   HENT:      Head: Atraumatic.      Mouth/Throat:      Mouth: Mucous membranes are moist.   Eyes:      Conjunctiva/sclera: Conjunctivae normal.   Cardiovascular:      Pulses: Normal pulses.   Pulmonary:      Effort: Pulmonary effort is normal.   Musculoskeletal:         General: Swelling (Mild left mid forearm) and tenderness (Mild left mid volar forearm) present.      Cervical back: Normal range of motion.   Skin:     General: Skin is warm and dry.      Findings: Erythema (Abrasion to mid volar left forearm) present.   Neurological:      Mental Status: She is alert and oriented to person, place, and time.   Psychiatric:         Mood and Affect: Mood normal.         Behavior: Behavior normal.            Medical Decision Making:      Comorbidities that affect care:    PCOS, anxiety    External Notes reviewed:    Previous " Clinic Note: Patient seen by PCP on February 19 for dental pain      The following orders were placed and all results were independently analyzed by me:  Orders Placed This Encounter   Procedures    XR Forearm 2 View Left    Wound Dressing       Medications Given in the Emergency Department:  Medications   ibuprofen (ADVIL,MOTRIN) tablet 600 mg (600 mg Oral Not Given 4/20/24 0451)   bacitracin 500 UNIT/GM ointment 0.9 g (0.9 g Topical Given 4/20/24 0452)        ED Course:    ED Course as of 04/20/24 0558   Sat Apr 20, 2024   0435 XR Forearm 2 View Left  No acute fracture [DS]      ED Course User Index  [DS] Gladis Heather, APRN       Labs:    Lab Results (last 24 hours)       ** No results found for the last 24 hours. **             Imaging:    XR Forearm 2 View Left    Result Date: 4/20/2024  XR FOREARM 2 VW LEFT-  Date of Exam: 4/20/2024 3:36 AM  Indication: injury/trauma; pain  Comparison: None available.  Findings: 2 views, labeled right forearm, are provided for review. The order states left forearm. Please correlate clinically. No acute fracture. No acute malalignment.      Impression: No acute fracture. No acute malalignment. The views are labeled right.    Electronically Signed By-Dirk Sher MD On:4/20/2024 3:52 AM         Differential Diagnosis and Discussion:    Extremity Pain: Differential diagnosis includes but is not limited to soft tissue sprain, tendonitis, tendon injury, dislocation, fracture, deep vein thrombosis, arterial insufficiency, osteoarthritis, bursitis, and ligamentous damage.    All X-rays impressions were independently interpreted by me.    MDM  Number of Diagnoses or Management Options  Contusion of left forearm, initial encounter  Diagnosis management comments: I have explained the patient´s condition, diagnoses and treatment plan based on the information available to me at this time. I have answered questions and addressed any concerns. The patient has a good  understanding of the  patient´s diagnosis, condition, and treatment plan as can be expected at this point. The vital signs have been stable. The patient´s condition is stable and appropriate for discharge from the emergency department.      The patient will pursue further outpatient evaluation with the primary care physician or other designated or consulting physician as outlined in the discharge instructions. They are agreeable to this plan of care and follow-up instructions have been explained in detail. The patient has received these instructions in written format and have expressed an understanding of the discharge instructions. The patient is aware that any significant change in condition or worsening of symptoms should prompt an immediate return to this or the closest emergency department or call to 911.       Amount and/or Complexity of Data Reviewed  Tests in the radiology section of CPT®: reviewed and ordered  Tests in the medicine section of CPT®: ordered and reviewed    Risk of Complications, Morbidity, and/or Mortality  Presenting problems: low  Diagnostic procedures: low  Management options: low    Patient Progress  Patient progress: stable         Patient Care Considerations:    NARCOTICS: I considered prescribing opiate pain medication as an outpatient, however no acute bony abnormality on imaging      Consultants/Shared Management Plan:    None    Social Determinants of Health:    Patient is independent, reliable, and has access to care.       Disposition and Care Coordination:    Discharged: The patient is suitable and stable for discharge with no need for consideration of admission.    I have explained the patient´s condition, diagnoses and treatment plan based on the information available to me at this time. I have answered questions and addressed any concerns. The patient has a good  understanding of the patient´s diagnosis, condition, and treatment plan as can be expected at this point. The vital signs have been  stable. The patient´s condition is stable and appropriate for discharge from the emergency department.      The patient will pursue further outpatient evaluation with the primary care physician or other designated or consulting physician as outlined in the discharge instructions. They are agreeable to this plan of care and follow-up instructions have been explained in detail. The patient has received these instructions in written format and has expressed an understanding of the discharge instructions. The patient is aware that any significant change in condition or worsening of symptoms should prompt an immediate return to this or the closest emergency department or call to 911.  I have explained discharge medications and the need for follow up with the patient/caretakers. This was also printed in the discharge instructions. Patient was discharged with the following medications and follow up:      Medication List      No changes were made to your prescriptions during this visit.      Lydia Lacy, APRN  [address] [place] [address] [phone]      As needed       Final diagnoses:   Contusion of left forearm, initial encounter        ED Disposition       ED Disposition   Discharge    Condition   Stable    Comment   --               This medical record created using voice recognition software.             Heather Griffith, APRMAYRA  04/20/24 0597

## 2024-08-27 ENCOUNTER — APPOINTMENT (OUTPATIENT)
Dept: CT IMAGING | Facility: HOSPITAL | Age: 19
End: 2024-08-27
Payer: MEDICAID

## 2024-08-27 ENCOUNTER — HOSPITAL ENCOUNTER (EMERGENCY)
Facility: HOSPITAL | Age: 19
Discharge: HOME | End: 2024-08-28
Attending: EMERGENCY MEDICINE
Payer: MEDICAID

## 2024-08-27 DIAGNOSIS — E28.1 ANDROGEN EXCESS: Primary | ICD-10-CM

## 2024-08-27 DIAGNOSIS — E28.2 POLYCYSTIC OVARIES: ICD-10-CM

## 2024-08-27 DIAGNOSIS — R21 RASH OF NECK: ICD-10-CM

## 2024-08-27 DIAGNOSIS — R22.32 NODULE OF FINGER OF LEFT HAND: ICD-10-CM

## 2024-08-27 LAB
ALBUMIN SERPL-MCNC: 4.4 G/DL (ref 3.5–5.2)
ALBUMIN/GLOB SERPL: 1.4 G/DL
ALP SERPL-CCNC: 49 U/L (ref 39–117)
ALT SERPL W P-5'-P-CCNC: 12 U/L (ref 1–33)
ANION GAP SERPL CALCULATED.3IONS-SCNC: 11.1 MMOL/L (ref 5–15)
AST SERPL-CCNC: 15 U/L (ref 1–32)
BASOPHILS # BLD AUTO: 0.06 10*3/MM3 (ref 0–0.2)
BASOPHILS NFR BLD AUTO: 0.8 % (ref 0–1.5)
BILIRUB SERPL-MCNC: 0.3 MG/DL (ref 0–1.2)
BUN SERPL-MCNC: 6 MG/DL (ref 6–20)
BUN/CREAT SERPL: 9 (ref 7–25)
CALCIUM SPEC-SCNC: 9.7 MG/DL (ref 8.6–10.5)
CHLORIDE SERPL-SCNC: 100 MMOL/L (ref 98–107)
CO2 SERPL-SCNC: 23.9 MMOL/L (ref 22–29)
CREAT SERPL-MCNC: 0.67 MG/DL (ref 0.57–1)
DEPRECATED RDW RBC AUTO: 39.9 FL (ref 37–54)
EGFRCR SERPLBLD CKD-EPI 2021: 127.7 ML/MIN/1.73
EOSINOPHIL # BLD AUTO: 0.13 10*3/MM3 (ref 0–0.4)
EOSINOPHIL NFR BLD AUTO: 1.8 % (ref 0.3–6.2)
ERYTHROCYTE [DISTWIDTH] IN BLOOD BY AUTOMATED COUNT: 13.3 % (ref 12.3–15.4)
GLOBULIN UR ELPH-MCNC: 3.1 GM/DL
GLUCOSE SERPL-MCNC: 93 MG/DL (ref 65–99)
HCT VFR BLD AUTO: 42.2 % (ref 34–46.6)
HGB BLD-MCNC: 13.9 G/DL (ref 12–15.9)
HOLD SPECIMEN: NORMAL
IMM GRANULOCYTES # BLD AUTO: 0.01 10*3/MM3 (ref 0–0.05)
IMM GRANULOCYTES NFR BLD AUTO: 0.1 % (ref 0–0.5)
LYMPHOCYTES # BLD AUTO: 2.95 10*3/MM3 (ref 0.7–3.1)
LYMPHOCYTES NFR BLD AUTO: 40 % (ref 19.6–45.3)
MCH RBC QN AUTO: 27.4 PG (ref 26.6–33)
MCHC RBC AUTO-ENTMCNC: 32.9 G/DL (ref 31.5–35.7)
MCV RBC AUTO: 83.2 FL (ref 79–97)
MONOCYTES # BLD AUTO: 0.37 10*3/MM3 (ref 0.1–0.9)
MONOCYTES NFR BLD AUTO: 5 % (ref 5–12)
NEUTROPHILS NFR BLD AUTO: 3.86 10*3/MM3 (ref 1.7–7)
NEUTROPHILS NFR BLD AUTO: 52.3 % (ref 42.7–76)
NRBC BLD AUTO-RTO: 0 /100 WBC (ref 0–0.2)
PLATELET # BLD AUTO: 205 10*3/MM3 (ref 140–450)
PMV BLD AUTO: 9.9 FL (ref 6–12)
POTASSIUM SERPL-SCNC: 4.1 MMOL/L (ref 3.5–5.2)
PROT SERPL-MCNC: 7.5 G/DL (ref 6–8.5)
RBC # BLD AUTO: 5.07 10*6/MM3 (ref 3.77–5.28)
SODIUM SERPL-SCNC: 135 MMOL/L (ref 136–145)
WBC NRBC COR # BLD AUTO: 7.38 10*3/MM3 (ref 3.4–10.8)
WHOLE BLOOD HOLD COAG: NORMAL

## 2024-08-27 PROCEDURE — 85025 COMPLETE CBC W/AUTO DIFF WBC: CPT

## 2024-08-27 PROCEDURE — 70490 CT SOFT TISSUE NECK W/O DYE: CPT

## 2024-08-27 PROCEDURE — 99284 EMERGENCY DEPT VISIT MOD MDM: CPT

## 2024-08-27 PROCEDURE — 36415 COLL VENOUS BLD VENIPUNCTURE: CPT

## 2024-08-27 PROCEDURE — 80053 COMPREHEN METABOLIC PANEL: CPT

## 2024-08-27 PROCEDURE — 84443 ASSAY THYROID STIM HORMONE: CPT

## 2024-08-28 ENCOUNTER — APPOINTMENT (OUTPATIENT)
Dept: GENERAL RADIOLOGY | Facility: HOSPITAL | Age: 19
End: 2024-08-28
Payer: MEDICAID

## 2024-08-28 VITALS
TEMPERATURE: 98.3 F | OXYGEN SATURATION: 97 % | DIASTOLIC BLOOD PRESSURE: 74 MMHG | HEIGHT: 64 IN | RESPIRATION RATE: 18 BRPM | SYSTOLIC BLOOD PRESSURE: 114 MMHG | BODY MASS INDEX: 19.8 KG/M2 | WEIGHT: 115.96 LBS | HEART RATE: 85 BPM

## 2024-08-28 LAB — TSH SERPL DL<=0.05 MIU/L-ACNC: 2.72 UIU/ML (ref 0.27–4.2)

## 2024-08-28 PROCEDURE — 73120 X-RAY EXAM OF HAND: CPT

## 2024-08-28 RX ORDER — SPIRONOLACTONE 25 MG/1
25 TABLET ORAL DAILY
Qty: 30 TABLET | Refills: 0 | Status: SHIPPED | OUTPATIENT
Start: 2024-08-28

## 2024-08-28 NOTE — ED PROVIDER NOTES
"Time: 10:48 PM EDT  Date of encounter:  8/27/2024  Independent Historian/Clinical History and Information was obtained by:   Patient    History is limited by: N/A    Chief Complaint   Patient presents with    Rash         History of Present Illness:  Patient is a 19 y.o. year old female who presents to the emergency department for evaluation of sore throat, difficulty swallowing, neck pain, headache for the last several months.  Patient reports she has lost a significant amount of weight due to being unable to tolerate eating or drinking as much.  She states she keeps putting off seeing a medical provider due to her lack of insurance.  Patient also reports skin discoloration to the anterior neck with darker brown spots appearing.  Patient also reports hardened \"bumps\" on her hand and coccyx for the last 3 months.  Patient denies fevers or chills. (PETE Lr, provider in triage)     Patient Care Team  Primary Care Provider: Lydia Lacy APRN    Past Medical History:     Allergies   Allergen Reactions    Buspirone Dizziness and Other (See Comments)     dizziness     Past Medical History:   Diagnosis Date    Anxiety     Chlamydia infection during pregnancy 03/03/2022    Febrile seizure     PCOS (polycystic ovarian syndrome)     PFO (patent foramen ovale)      Past Surgical History:   Procedure Laterality Date    ADENOIDECTOMY      APPENDECTOMY      LIPOMA EXCISION      foot    TONSILLECTOMY       Family History   Problem Relation Age of Onset    No Known Problems Father     No Known Problems Mother     Ovarian cancer Maternal Grandmother     Colon cancer Maternal Grandmother     Breast cancer Maternal Grandmother 30    Prostate cancer Neg Hx     Uterine cancer Neg Hx        Home Medications:  Prior to Admission medications    Medication Sig Start Date End Date Taking? Authorizing Provider   diclofenac (VOLTAREN) 75 MG EC tablet Take 1 tablet by mouth 2 (Two) Times a Day As Needed (pain). 12/14/23   " "Heather Griffith APRN   Etonogestrel (NEXPLANON) 68 MG implant subdermal implant Inject 1 each into the appropriate area of the skin as directed by provider 1 (One) Time.    Provider, MD Concepción   Lidocaine Viscous HCl (XYLOCAINE) 2 % solution Take 5 mL by mouth As Needed for Mild Pain. Saturate gauze or cotton ball and apply to affected tooth as needed for pain 12/14/23   Heather Griffith APRN        Social History:   Social History     Tobacco Use    Smoking status: Never     Passive exposure: Never    Smokeless tobacco: Never   Vaping Use    Vaping status: Every Day    Substances: Nicotine    Devices: Disposable   Substance Use Topics    Alcohol use: Never    Drug use: Never         Review of Systems:  Review of Systems   Constitutional:  Positive for appetite change. Negative for fatigue.   HENT:  Positive for sore throat, trouble swallowing and voice change.    Respiratory:  Negative for shortness of breath.    Cardiovascular:  Negative for chest pain.   Genitourinary:  Negative for dysuria.   Musculoskeletal:  Positive for arthralgias and neck pain.   Skin:  Positive for rash.   Neurological:  Positive for headaches.        Physical Exam:  /79 (BP Location: Left arm, Patient Position: Sitting)   Pulse 88   Temp 98.7 °F (37.1 °C) (Oral)   Resp 16   Ht 162.6 cm (64\")   Wt 52.6 kg (115 lb 15.4 oz)   SpO2 100%   BMI 19.90 kg/m²         Physical Exam  Vitals reviewed.   Constitutional:       Appearance: Normal appearance.   HENT:      Head: Normocephalic and atraumatic.      Jaw: There is normal jaw occlusion.      Mouth/Throat:      Lips: Pink.      Mouth: Mucous membranes are moist.      Dentition: Dental caries present. No dental abscesses.      Pharynx: Oropharynx is clear. Uvula midline.   Eyes:      Pupils: Pupils are equal, round, and reactive to light.   Neck:      Trachea: Trachea normal.   Pulmonary:      Effort: Pulmonary effort is normal.   Abdominal:      General: There is no distension. "   Musculoskeletal:      Left hand: Deformity present.        Hands:       Cervical back: Neck supple. Pain with movement and muscular tenderness present.   Lymphadenopathy:      Cervical: No cervical adenopathy.   Skin:     General: Skin is warm and dry.      Findings: Rash present. Rash is crusting (Light brown colored around base of neck).   Neurological:      General: No focal deficit present.      Mental Status: She is alert and oriented to person, place, and time.   Psychiatric:         Mood and Affect: Mood normal.         Behavior: Behavior normal.                    Procedures:  Procedures      Medical Decision Making:      Comorbidities that affect care:    Smoking    External Notes reviewed:    None      The following orders were placed and all results were independently analyzed by me:  No orders of the defined types were placed in this encounter.      Medications Given in the Emergency Department:  Medications - No data to display     ED Course:    The patient was initially evaluated in the triage area where orders were placed. The patient was later dispositioned by PETE Lr.      The patient was advised to stay for completion of workup which includes but is not limited to communication of labs and radiological results, reassessment and plan. The patient was advised that leaving prior to disposition by a provider could result in critical findings that are not communicated to the patient.          Labs:    Lab Results (last 24 hours)       ** No results found for the last 24 hours. **             Imaging:    No Radiology Exams Resulted Within Past 24 Hours      Differential Diagnosis and Discussion:      Rash: Differential diagnosis includes but is not limited to sepsis, cellulitis, [place] Spotted Fever, meningitis, meningococcemia, Varicella, Strep infection, dermatitis, allergic reaction, Lyme disease, and toxic shock syndrome.    All labs were reviewed and interpreted by me.  CT  scan radiology impression was interpreted by me.    MDM  Number of Diagnoses or Management Options  Androgen excess  Nodule of finger of left hand  Polycystic ovaries  Rash of neck  Diagnosis management comments: The patient is now resting comfortably, and feels better, is alert, is not toxic, and is in no distress. The patient has a normal mental status and is neurologically intact. The rash does not have petechiae or purpura. There are no mucous membrane lesions, no signs of abscess, and no bullae. The patient appears well, has no fever, altered mental status for signs of systemic toxicity. The history, exam, diagnostic testing (if any) and current conditions do not demonstrate any signs of sepsis, [place] Spotted Fever, meningitis, meningococcemia, Lyme disease, toxic shock syndrome or other significant systemic illness requiring further treatment, testing or consultation in the emergency department. The vital signs have been stable. The patient's condition is stable and appropriate for discharge. The patient will pursue further outpatient evaluation with the primary care physician or other designated or consulting physician as indicated in the discharge instructions.       Amount and/or Complexity of Data Reviewed  Clinical lab tests: reviewed  Tests in the radiology section of CPT®: reviewed           Patient Care Considerations:    ANTIBIOTICS: I considered prescribing antibiotics as an outpatient however no bacterial focus of infection was found.      Consultants/Shared Management Plan:    None    Social Determinants of Health:    Patient is independent, reliable, and has access to care.       Disposition and Care Coordination:    Discharged: The patient is suitable and stable for discharge with no need for consideration of admission.    I have explained the patient´s condition, diagnoses and treatment plan based on the information available to me at this time. I have answered questions and addressed  any concerns. The patient has a good  understanding of the patient´s diagnosis, condition, and treatment plan as can be expected at this point. The vital signs have been stable. The patient´s condition is stable and appropriate for discharge from the emergency department.      The patient will pursue further outpatient evaluation with the primary care physician or other designated or consulting physician as outlined in the discharge instructions. They are agreeable to this plan of care and follow-up instructions have been explained in detail. The patient has received these instructions in written format and has expressed an understanding of the discharge instructions. The patient is aware that any significant change in condition or worsening of symptoms should prompt an immediate return to this or the closest emergency department or call to 911.  I have explained discharge medications and the need for follow up with the patient/caretakers. This was also printed in the discharge instructions. Patient was discharged with the following medications and follow up:      Medication List        New Prescriptions      spironolactone 25 MG tablet  Commonly known as: ALDACTONE  Take 1 tablet by mouth Daily.               Where to Get Your Medications        These medications were sent to [facility]/pharm[facility] #[id number] - [place], [place] - [address] - [phone] [facility] [phone] FX  [address] [place] [address]      Hours: 24-hours Phone: [phone]   spironolactone 25 MG tablet      [facility]  [address] [address]  [phone]          Final diagnoses:   None        ED Disposition       None            This medical record created using voice recognition software.             Seaver, Alyce B, APRN  08/28/24 0125

## 2024-08-28 NOTE — DISCHARGE INSTRUCTIONS
He will be referred to her primary care provider.  They should call you within 2-3 business days to schedule an appointment however if you have not heard from them get them a call using the number provided.  Return to ER symptoms worsen or fail to improve.    You are being sent home with a medication for excess androgens, spironolactone.  Take daily until you follow-up with your primary care provider.

## 2024-08-28 NOTE — ED TRIAGE NOTES
Pt to ED from home with reports of darkened skin to neck and bumps to palms of hand and right tailbone x several months, but symptoms are worse today.    Pt states she has left sided neck swelling that is causing her difficulty swallowing and weight loss.  Pt states in January she weighed 160lb; pt currently weighs 115lb.    Pt states she sometimes vomits while eating r/t food feeling like it is stuck in throat but also states she has panic disorder and thinks some of the vomiting is anxiety related.

## 2024-09-12 LAB
QT INTERVAL: 379 MS
QTC INTERVAL: 420 MS

## 2024-09-12 PROCEDURE — 93005 ELECTROCARDIOGRAM TRACING: CPT

## 2024-09-12 PROCEDURE — 84484 ASSAY OF TROPONIN QUANT: CPT | Performed by: EMERGENCY MEDICINE

## 2024-09-12 PROCEDURE — 80053 COMPREHEN METABOLIC PANEL: CPT | Performed by: EMERGENCY MEDICINE

## 2024-09-12 PROCEDURE — 85025 COMPLETE CBC W/AUTO DIFF WBC: CPT | Performed by: EMERGENCY MEDICINE

## 2024-09-12 PROCEDURE — 36415 COLL VENOUS BLD VENIPUNCTURE: CPT | Performed by: EMERGENCY MEDICINE

## 2024-09-12 PROCEDURE — 93005 ELECTROCARDIOGRAM TRACING: CPT | Performed by: EMERGENCY MEDICINE

## 2024-09-12 PROCEDURE — 83880 ASSAY OF NATRIURETIC PEPTIDE: CPT | Performed by: EMERGENCY MEDICINE

## 2024-09-12 PROCEDURE — 84702 CHORIONIC GONADOTROPIN TEST: CPT

## 2024-09-12 PROCEDURE — 99284 EMERGENCY DEPT VISIT MOD MDM: CPT

## 2024-09-12 RX ORDER — SODIUM CHLORIDE 0.9 % (FLUSH) 0.9 %
10 SYRINGE (ML) INJECTION AS NEEDED
Status: DISCONTINUED | OUTPATIENT
Start: 2024-09-12 | End: 2024-09-13 | Stop reason: HOSPADM

## 2024-09-13 ENCOUNTER — APPOINTMENT (OUTPATIENT)
Dept: GENERAL RADIOLOGY | Facility: HOSPITAL | Age: 19
End: 2024-09-13
Payer: MEDICAID

## 2024-09-13 ENCOUNTER — HOSPITAL ENCOUNTER (EMERGENCY)
Facility: HOSPITAL | Age: 19
Discharge: HOME | End: 2024-09-13
Attending: EMERGENCY MEDICINE
Payer: MEDICAID

## 2024-09-13 VITALS
SYSTOLIC BLOOD PRESSURE: 120 MMHG | RESPIRATION RATE: 20 BRPM | BODY MASS INDEX: 19.76 KG/M2 | HEIGHT: 64 IN | TEMPERATURE: 98 F | WEIGHT: 115.74 LBS | HEART RATE: 78 BPM | OXYGEN SATURATION: 97 % | DIASTOLIC BLOOD PRESSURE: 77 MMHG

## 2024-09-13 DIAGNOSIS — N39.0 ACUTE UTI: Primary | ICD-10-CM

## 2024-09-13 LAB
ALBUMIN SERPL-MCNC: 4.6 G/DL (ref 3.5–5.2)
ALBUMIN/GLOB SERPL: 1.2 G/DL
ALP SERPL-CCNC: 57 U/L (ref 39–117)
ALT SERPL W P-5'-P-CCNC: 14 U/L (ref 1–33)
ANION GAP SERPL CALCULATED.3IONS-SCNC: 14.5 MMOL/L (ref 5–15)
AST SERPL-CCNC: 17 U/L (ref 1–32)
BACTERIA UR QL AUTO: ABNORMAL /HPF
BASOPHILS # BLD AUTO: 0.05 10*3/MM3 (ref 0–0.2)
BASOPHILS NFR BLD AUTO: 0.7 % (ref 0–1.5)
BILIRUB SERPL-MCNC: 0.3 MG/DL (ref 0–1.2)
BILIRUB UR QL STRIP: NEGATIVE
BUN SERPL-MCNC: 5 MG/DL (ref 6–20)
BUN/CREAT SERPL: 8.3 (ref 7–25)
CALCIUM SPEC-SCNC: 9.7 MG/DL (ref 8.6–10.5)
CHLORIDE SERPL-SCNC: 99 MMOL/L (ref 98–107)
CLARITY UR: ABNORMAL
CO2 SERPL-SCNC: 22.5 MMOL/L (ref 22–29)
COLOR UR: YELLOW
CREAT SERPL-MCNC: 0.6 MG/DL (ref 0.57–1)
DEPRECATED RDW RBC AUTO: 38.6 FL (ref 37–54)
EGFRCR SERPLBLD CKD-EPI 2021: 131.2 ML/MIN/1.73
EOSINOPHIL # BLD AUTO: 0.2 10*3/MM3 (ref 0–0.4)
EOSINOPHIL NFR BLD AUTO: 2.7 % (ref 0.3–6.2)
ERYTHROCYTE [DISTWIDTH] IN BLOOD BY AUTOMATED COUNT: 12.8 % (ref 12.3–15.4)
FLUAV SUBTYP SPEC NAA+PROBE: NOT DETECTED
FLUBV RNA ISLT QL NAA+PROBE: NOT DETECTED
GLOBULIN UR ELPH-MCNC: 3.7 GM/DL
GLUCOSE SERPL-MCNC: 90 MG/DL (ref 65–99)
GLUCOSE UR STRIP-MCNC: NEGATIVE MG/DL
HCG INTACT+B SERPL-ACNC: <0.5 MIU/ML
HCT VFR BLD AUTO: 43.8 % (ref 34–46.6)
HGB BLD-MCNC: 14.3 G/DL (ref 12–15.9)
HGB UR QL STRIP.AUTO: ABNORMAL
HOLD SPECIMEN: NORMAL
HOLD SPECIMEN: NORMAL
HYALINE CASTS UR QL AUTO: ABNORMAL /LPF
IMM GRANULOCYTES # BLD AUTO: 0.02 10*3/MM3 (ref 0–0.05)
IMM GRANULOCYTES NFR BLD AUTO: 0.3 % (ref 0–0.5)
KETONES UR QL STRIP: NEGATIVE
LEUKOCYTE ESTERASE UR QL STRIP.AUTO: ABNORMAL
LYMPHOCYTES # BLD AUTO: 3.12 10*3/MM3 (ref 0.7–3.1)
LYMPHOCYTES NFR BLD AUTO: 41.9 % (ref 19.6–45.3)
MCH RBC QN AUTO: 27.3 PG (ref 26.6–33)
MCHC RBC AUTO-ENTMCNC: 32.6 G/DL (ref 31.5–35.7)
MCV RBC AUTO: 83.7 FL (ref 79–97)
MONOCYTES # BLD AUTO: 0.37 10*3/MM3 (ref 0.1–0.9)
MONOCYTES NFR BLD AUTO: 5 % (ref 5–12)
NEUTROPHILS NFR BLD AUTO: 3.68 10*3/MM3 (ref 1.7–7)
NEUTROPHILS NFR BLD AUTO: 49.4 % (ref 42.7–76)
NITRITE UR QL STRIP: NEGATIVE
NRBC BLD AUTO-RTO: 0 /100 WBC (ref 0–0.2)
NT-PROBNP SERPL-MCNC: 72 PG/ML (ref 0–450)
PH UR STRIP.AUTO: 6 [PH] (ref 5–8)
PLATELET # BLD AUTO: 207 10*3/MM3 (ref 140–450)
PMV BLD AUTO: 10.4 FL (ref 6–12)
POTASSIUM SERPL-SCNC: 3.9 MMOL/L (ref 3.5–5.2)
PROT SERPL-MCNC: 8.3 G/DL (ref 6–8.5)
PROT UR QL STRIP: NEGATIVE
RBC # BLD AUTO: 5.23 10*6/MM3 (ref 3.77–5.28)
RBC # UR STRIP: ABNORMAL /HPF
REF LAB TEST METHOD: ABNORMAL
RSV RNA NPH QL NAA+NON-PROBE: NOT DETECTED
SARS-COV-2 RNA RESP QL NAA+PROBE: NOT DETECTED
SODIUM SERPL-SCNC: 136 MMOL/L (ref 136–145)
SP GR UR STRIP: 1.01 (ref 1–1.03)
SQUAMOUS #/AREA URNS HPF: ABNORMAL /HPF
TROPONIN T SERPL HS-MCNC: <6 NG/L
UROBILINOGEN UR QL STRIP: ABNORMAL
WBC # UR STRIP: ABNORMAL /HPF
WBC NRBC COR # BLD AUTO: 7.44 10*3/MM3 (ref 3.4–10.8)
WHOLE BLOOD HOLD COAG: NORMAL
WHOLE BLOOD HOLD SPECIMEN: NORMAL

## 2024-09-13 PROCEDURE — 87637 SARSCOV2&INF A&B&RSV AMP PRB: CPT

## 2024-09-13 PROCEDURE — 81001 URINALYSIS AUTO W/SCOPE: CPT | Performed by: EMERGENCY MEDICINE

## 2024-09-13 PROCEDURE — 87086 URINE CULTURE/COLONY COUNT: CPT | Performed by: EMERGENCY MEDICINE

## 2024-09-13 PROCEDURE — 71045 X-RAY EXAM CHEST 1 VIEW: CPT

## 2024-09-13 PROCEDURE — 87077 CULTURE AEROBIC IDENTIFY: CPT | Performed by: EMERGENCY MEDICINE

## 2024-09-13 PROCEDURE — 87186 SC STD MICRODIL/AGAR DIL: CPT | Performed by: EMERGENCY MEDICINE

## 2024-09-13 RX ORDER — CEFUROXIME AXETIL 500 MG/1
500 TABLET ORAL 2 TIMES DAILY
Qty: 10 TABLET | Refills: 0 | Status: SHIPPED | OUTPATIENT
Start: 2024-09-13

## 2024-09-13 NOTE — ED PROVIDER NOTES
Time: 11:58 PM EDT  Date of encounter:  9/12/2024  Independent Historian/Clinical History and Information was obtained by:   Patient    History is limited by: N/A    Chief Complaint: Cough      History of Present Illness:  Patient is a 19 y.o. year old female who presents to the emergency department for evaluation of cough x 2 days.  Patient has productive cough of white sputum.  States that she went to [facility] and was checked for COVID, flu and strep.  All negative.  Patient states her significant other recently diagnosed with pneumonia.  Patient states she does have shortness of breath with deep inspiration.  Denies fevers      Patient Care Team  Primary Care Provider: Lydia Lacy APRN    Past Medical History:     Allergies   Allergen Reactions    Buspirone Dizziness and Other (See Comments)     dizziness     Past Medical History:   Diagnosis Date    Anxiety     Chlamydia infection during pregnancy 03/03/2022    Febrile seizure     PCOS (polycystic ovarian syndrome)     PFO (patent foramen ovale)      Past Surgical History:   Procedure Laterality Date    ADENOIDECTOMY      APPENDECTOMY      LIPOMA EXCISION      foot    TONSILLECTOMY       Family History   Problem Relation Age of Onset    No Known Problems Father     No Known Problems Mother     Ovarian cancer Maternal Grandmother     Colon cancer Maternal Grandmother     Breast cancer Maternal Grandmother 30    Prostate cancer Neg Hx     Uterine cancer Neg Hx        Home Medications:  Prior to Admission medications    Medication Sig Start Date End Date Taking? Authorizing Provider   diclofenac (VOLTAREN) 75 MG EC tablet Take 1 tablet by mouth 2 (Two) Times a Day As Needed (pain). 12/14/23   Heather Griffith APRN   Etonogestrel (NEXPLANON) 68 MG implant subdermal implant Inject 1 each into the appropriate area of the skin as directed by provider 1 (One) Time.    Provider, MD Concepción   Lidocaine Viscous HCl (XYLOCAINE) 2 % solution Take 5  "mL by mouth As Needed for Mild Pain. Saturate gauze or cotton ball and apply to affected tooth as needed for pain 12/14/23   Heather Griffith APRN   spironolactone (ALDACTONE) 25 MG tablet Take 1 tablet by mouth Daily. 8/28/24   Seaver, Alyce B, APRN        Social History:   Social History     Tobacco Use    Smoking status: Never     Passive exposure: Never    Smokeless tobacco: Never   Vaping Use    Vaping status: Every Day    Substances: Nicotine    Devices: Disposable   Substance Use Topics    Alcohol use: Never    Drug use: Never         Review of Systems:  Review of Systems   Constitutional:  Positive for chills. Negative for fever.   HENT:  Negative for congestion, ear pain and sore throat.    Eyes:  Negative for pain.   Respiratory:  Positive for cough and shortness of breath. Negative for chest tightness.    Cardiovascular:  Negative for chest pain.   Gastrointestinal:  Negative for abdominal pain, diarrhea, nausea and vomiting.   Genitourinary:  Negative for flank pain and hematuria.   Musculoskeletal:  Negative for joint swelling.   Skin:  Negative for pallor.   Neurological:  Negative for seizures and headaches.   All other systems reviewed and are negative.       Physical Exam:  /77 (BP Location: Right arm, Patient Position: Sitting)   Pulse 78   Temp 98 °F (36.7 °C) (Oral)   Resp 20   Ht 162.6 cm (64\")   Wt 52.5 kg (115 lb 11.9 oz)   SpO2 97%   BMI 19.87 kg/m²     Physical Exam  Vitals and nursing note reviewed.   Constitutional:       General: She is not in acute distress.     Appearance: Normal appearance. She is not toxic-appearing.   HENT:      Head: Normocephalic and atraumatic.      Jaw: There is normal jaw occlusion.      Mouth/Throat:      Mouth: Mucous membranes are moist.   Eyes:      General: Lids are normal.      Extraocular Movements: Extraocular movements intact.      Conjunctiva/sclera: Conjunctivae normal.      Pupils: Pupils are equal, round, and reactive to light. "   Cardiovascular:      Rate and Rhythm: Normal rate and regular rhythm.      Pulses: Normal pulses.      Heart sounds: Normal heart sounds.   Pulmonary:      Effort: Pulmonary effort is normal. No respiratory distress.      Breath sounds: Normal breath sounds. No wheezing or rhonchi.   Abdominal:      General: Abdomen is flat. There is no distension.      Palpations: Abdomen is soft.      Tenderness: There is no abdominal tenderness. There is no guarding or rebound.   Musculoskeletal:         General: Normal range of motion.      Cervical back: Normal range of motion and neck supple.      Right lower leg: No edema.      Left lower leg: No edema.   Skin:     General: Skin is warm and dry.   Neurological:      General: No focal deficit present.      Mental Status: She is alert and oriented to person, place, and time. Mental status is at baseline.   Psychiatric:         Mood and Affect: Mood normal.         Behavior: Behavior normal.           Procedures:  Procedures      Medical Decision Making:      Comorbidities that affect care:    PFO, PCOS, anxiety    External Notes reviewed:    Previous Clinic Note: PCP visit for sore throat December 2023      The following orders were placed and all results were independently analyzed by me:  Orders Placed This Encounter   Procedures    COVID-19, FLU A/B, RSV PCR 1 HR TAT - Swab, Nasopharynx    Urine Culture - Urine,    XR Chest 1 View    [place] Draw    Comprehensive Metabolic Panel    BNP    Single High Sensitivity Troponin T    CBC Auto Differential    Urinalysis With Culture If Indicated - Urine, Clean Catch    hCG, Quantitative, Pregnancy    Urinalysis, Microscopic Only - Urine, Clean Catch    ECG 12 Lead ED Triage Standing Order; SOA    CBC & Differential    Green Top (Gel)    Lavender Top    Gold Top - SST    Light Blue Top       Medications Given in the Emergency Department:  Medications - No data to display       ED Course:    ED Course as of 09/13/24 0530   Fri Sep  13, 2024   0000 --- PROVIDER IN TRIAGE NOTE ---    The patient was evaluated by Song betancur in triage. Orders were placed and the patient is currently awaiting disposition.    [AJ]      ED Course User Index  [AJ] Song Garcia PA-C       Labs:    Lab Results (last 24 hours)       Procedure Component Value Units Date/Time    CBC & Differential [[id number]]  (Abnormal) Collected: 09/12/24 2353    Specimen: Blood Updated: 09/13/24 0009    Narrative:      The following orders were created for panel order CBC & Differential.  Procedure                               Abnormality         Status                     ---------                               -----------         ------                     CBC Auto Differential[[id number]]        Abnormal            Final result                 Please view results for these tests on the individual orders.    Comprehensive Metabolic Panel [[id number]]  (Abnormal) Collected: 09/12/24 2353    Specimen: Blood Updated: 09/13/24 0038     Glucose 90 mg/dL      BUN 5 mg/dL      Creatinine 0.60 mg/dL      Sodium 136 mmol/L      Potassium 3.9 mmol/L      Chloride 99 mmol/L      CO2 22.5 mmol/L      Calcium 9.7 mg/dL      Total Protein 8.3 g/dL      Albumin 4.6 g/dL      ALT (SGPT) 14 U/L      AST (SGOT) 17 U/L      Alkaline Phosphatase 57 U/L      Total Bilirubin 0.3 mg/dL      Globulin 3.7 gm/dL      A/G Ratio 1.2 g/dL      BUN/Creatinine Ratio 8.3     Anion Gap 14.5 mmol/L      eGFR 131.2 mL/min/1.73     Narrative:      GFR Normal >60  Chronic Kidney Disease <60  Kidney Failure <15      BNP [[id number]]  (Normal) Collected: 09/12/24 2353    Specimen: Blood Updated: 09/13/24 0033     proBNP 72.0 pg/mL     Narrative:      This assay is used as an aid in the diagnosis of individuals suspected of having heart failure. It can be used as an aid in the diagnosis of acute decompensated heart failure (ADHF) in patients presenting with signs and symptoms of ADHF to the emergency  department (ED). In addition, NT-proBNP of <300 pg/mL indicates ADHF is not likely.    Age Range Result Interpretation  NT-proBNP Concentration (pg/mL:      <50             Positive            >450                   Gray                 300-450                    Negative             <300    50-75           Positive            >900                  Gray                300-900                  Negative            <300      >75             Positive            >1800                  Gray                300-1800                  Negative            <300    Single High Sensitivity Troponin T [[id number]]  (Normal) Collected: 09/12/24 2353    Specimen: Blood Updated: 09/13/24 0038     HS Troponin T <6 ng/L     Narrative:      High Sensitive Troponin T Reference Range:  <14.0 ng/L- Negative Female for AMI  <22.0 ng/L- Negative Male for AMI  >=14 - Abnormal Female indicating possible myocardial injury.  >=22 - Abnormal Male indicating possible myocardial injury.   Clinicians would have to utilize clinical acumen, EKG, Troponin, and serial changes to determine if it is an Acute Myocardial Infarction or myocardial injury due to an underlying chronic condition.         CBC Auto Differential [[id number]]  (Abnormal) Collected: 09/12/24 2353    Specimen: Blood Updated: 09/13/24 0009     WBC 7.44 10*3/mm3      RBC 5.23 10*6/mm3      Hemoglobin 14.3 g/dL      Hematocrit 43.8 %      MCV 83.7 fL      MCH 27.3 pg      MCHC 32.6 g/dL      RDW 12.8 %      RDW-SD 38.6 fl      MPV 10.4 fL      Platelets 207 10*3/mm3      Neutrophil % 49.4 %      Lymphocyte % 41.9 %      Monocyte % 5.0 %      Eosinophil % 2.7 %      Basophil % 0.7 %      Immature Grans % 0.3 %      Neutrophils, Absolute 3.68 10*3/mm3      Lymphocytes, Absolute 3.12 10*3/mm3      Monocytes, Absolute 0.37 10*3/mm3      Eosinophils, Absolute 0.20 10*3/mm3      Basophils, Absolute 0.05 10*3/mm3      Immature Grans, Absolute 0.02 10*3/mm3      nRBC 0.0 /100 WBC     hCG,  Quantitative, Pregnancy [[id number]] Collected: 09/12/24 2353    Specimen: Blood Updated: 09/13/24 0042     HCG Quantitative <0.50 mIU/mL     Narrative:      HCG Ranges by Gestational Age    Females - non-pregnant premenopausal   </= 1mIU/mL HCG  Females - postmenopausal               </= 7mIU/mL HCG    3 Weeks       5.4   -      72 mIU/mL  4 Weeks      10.2   -     708 mIU/mL  5 Weeks       217   -   8,245 mIU/mL  6 Weeks       152   -  32,177 mIU/mL  7 Weeks     4,059   - 153,767 mIU/mL  8 Weeks    31,366   - 149,094 mIU/mL  9 Weeks    59,109   - 135,901 mIU/mL  10 Weeks   44,186   - 170,409 mIU/mL  12 Weeks   27,107   - 201,615 mIU/mL  14 Weeks   24,302   -  93,646 mIU/mL  15 Weeks   12,540   -  69,747 mIU/mL  16 Weeks    8,904   -  55,332 mIU/mL  17 Weeks    8,240   -  51,793 mIU/mL  18 Weeks    9,649   -  55,271 mIU/mL      COVID-19, FLU A/B, RSV PCR 1 HR TAT - Swab, Nasopharynx [[id number]]  (Normal) Collected: 09/13/24 0002    Specimen: Swab from Nasopharynx Updated: 09/13/24 0045     COVID19 Not Detected     Influenza A PCR Not Detected     Influenza B PCR Not Detected     RSV, PCR Not Detected    Narrative:      Fact sheet for providers: https://www.fda.gov/media/313394/download    Fact sheet for patients: https://www.fda.gov/media/823831/download    Test performed by PCR.    Urinalysis With Culture If Indicated - Urine, Clean Catch [[id number]]  (Abnormal) Collected: 09/13/24 0014    Specimen: Urine, Clean Catch Updated: 09/13/24 0043     Color, UA Yellow     Appearance, UA Cloudy     pH, UA 6.0     Specific Gravity, UA 1.010     Glucose, UA Negative     Ketones, UA Negative     Bilirubin, UA Negative     Blood, UA Large (3+)     Protein, UA Negative     Leuk Esterase, UA Small (1+)     Nitrite, UA Negative     Urobilinogen, UA 0.2 E.U./dL    Narrative:      In absence of clinical symptoms, the presence of pyuria, bacteria, and/or nitrites on the urinalysis result does not correlate with infection.     Urinalysis, Microscopic Only - Urine, Clean Catch [[id number]]  (Abnormal) Collected: 09/13/24 0014    Specimen: Urine, Clean Catch Updated: 09/13/24 0044     RBC, UA 0-2 /HPF      WBC, UA 6-10 /HPF      Bacteria, UA 2+ /HPF      Squamous Epithelial Cells, UA 0-2 /HPF      Hyaline Casts, UA 0-2 /LPF      Methodology Automated Microscopy    Urine Culture - Urine, Urine, Clean Catch [[id number]] Collected: 09/13/24 0014    Specimen: Urine, Clean Catch Updated: 09/13/24 0044             Imaging:    XR Chest 1 View    Result Date: 9/13/2024  XR CHEST 1 VW-  Date of exam: 9/13/2024 12:35 AM.  Indication: SOA/SOB/shortness of air/shortness of breath.  Comparison: 11/19/2020.  FINDINGS: A single AP (or PA) upright portable chest radiograph was performed. No cardiac enlargement is seen. No acute infiltrate is appreciated. No pleural effusion or pneumothorax is identified. External artifacts obscure detail. No significant interval change is seen since the prior study (or studies).       No acute infiltrate is appreciated.    Portions of this note were completed with a voice recognition program.   Electronically Signed By-Dirk Sher MD On:9/13/2024 1:12 AM         Differential Diagnosis and Discussion:    Cough: Differential diagnosis includes but is not limited to pneumonia, acute bronchitis, upper respiratory infection, ACE inhibitor use, allergic reaction, epiglottitis, seasonal allergies, chemical irritants, exercise-induced asthma, viral syndrome.    All labs were reviewed and interpreted by me.  All X-rays impressions were independently interpreted by me.    MDM                     Patient Care Considerations:    NARCOTICS: I considered prescribing opiate pain medication as an outpatient, however No pain control required in the emergency department.      Consultants/Shared Management Plan:    None    Social Determinants of Health:    Patient is independent, reliable, and has access to care.       Disposition and Care  Coordination:    Discharged: The patient is suitable and stable for discharge with no need for consideration of admission.    I have explained the patient´s condition, diagnoses and treatment plan based on the information available to me at this time. I have answered questions and addressed any concerns. The patient has a good  understanding of the patient´s diagnosis, condition, and treatment plan as can be expected at this point. The vital signs have been stable. The patient´s condition is stable and appropriate for discharge from the emergency department.      The patient will pursue further outpatient evaluation with the primary care physician or other designated or consulting physician as outlined in the discharge instructions. They are agreeable to this plan of care and follow-up instructions have been explained in detail. The patient has received these instructions in written format and has expressed an understanding of the discharge instructions. The patient is aware that any significant change in condition or worsening of symptoms should prompt an immediate return to this or the closest emergency department or call to 911.  I have explained discharge medications and the need for follow up with the patient/caretakers. This was also printed in the discharge instructions. Patient was discharged with the following medications and follow up:      Medication List        New Prescriptions      cefuroxime 500 MG tablet  Commonly known as: CEFTIN  Take 1 tablet by mouth 2 (Two) Times a Day.               Where to Get Your Medications        These medications were sent to [facility]/pharmacy #[id number] - [place], [place] - [address] - [phone] [facility] [phone] FX  [address] [place] [address]      Hours: 24-hours Phone: [phone]   cefuroxime 500 MG tablet      Lydia Lacy, APRN  [address] [place] [address] [phone]    Schedule an appointment as soon as possible for a visit           Final diagnoses:   Acute UTI        ED Disposition       ED Disposition   Discharge    Condition   Stable    Comment   --               This medical record created using voice recognition software.             Yinka Parsons MD  09/13/24 1197     No

## 2024-09-15 LAB — BACTERIA SPEC AEROBE CULT: ABNORMAL

## 2024-09-23 LAB
QT INTERVAL: 379 MS
QTC INTERVAL: 420 MS

## 2024-09-27 NOTE — TELEPHONE ENCOUNTER
Caller: Ashleigh Magdaleno    Relationship: Self    Best call back number: [phone]     Requested Prescriptions:   Requested Prescriptions     Pending Prescriptions Disp Refills    venlafaxine XR (Effexor XR) 75 MG 24 hr capsule 30 capsule 2     Sig: Take 1 capsule by mouth Daily.        Pharmacy where request should be sent: [address] [phone] [facility] [phone]      Last office visit with prescribing clinician: 3/6/2023   Last telemedicine visit with prescribing clinician: Visit date not found   Next office visit with prescribing clinician: 6/22/2023     Additional details provided by patient: PATIENT WOULD LIKE SHORT TERM SUPPLY SENT IN TO GET HER TO HER APPOINTMENT DATE OF 06.22.2023. PATIENT HAS BEEN OUT FOR 2 DAYS    Does the patient have less than a 3 day supply:  [x] Yes  [] No    Would you like a call back once the refill request has been completed: [] Yes [x] No    If the office needs to give you a call back, can they leave a voicemail: [] Yes [x] No    Kym Urbina Rep   06/15/23 13:35 EDT         
pulse oximetry

## 2024-10-23 ENCOUNTER — TELEPHONE (OUTPATIENT)
Dept: OBSTETRICS AND GYNECOLOGY | Facility: CLINIC | Age: 19
End: 2024-10-23
Payer: MEDICAID

## 2024-11-25 ENCOUNTER — HOSPITAL ENCOUNTER (EMERGENCY)
Facility: HOSPITAL | Age: 19
Discharge: LEFT BEFORE DISCHARGE | End: 2024-11-25
Attending: EMERGENCY MEDICINE
Payer: MEDICAID

## 2024-11-25 PROCEDURE — 99211 OFF/OP EST MAY X REQ PHY/QHP: CPT | Performed by: EMERGENCY MEDICINE

## 2025-05-08 ENCOUNTER — OFFICE VISIT (OUTPATIENT)
Dept: FAMILY MEDICINE CLINIC | Facility: CLINIC | Age: 20
End: 2025-05-08
Payer: COMMERCIAL

## 2025-05-08 VITALS
OXYGEN SATURATION: 99 % | BODY MASS INDEX: 18.61 KG/M2 | DIASTOLIC BLOOD PRESSURE: 64 MMHG | SYSTOLIC BLOOD PRESSURE: 118 MMHG | WEIGHT: 109 LBS | TEMPERATURE: 97.7 F | HEART RATE: 92 BPM | HEIGHT: 64 IN

## 2025-05-08 DIAGNOSIS — R63.1 POLYDIPSIA: ICD-10-CM

## 2025-05-08 DIAGNOSIS — M25.60 LIMITED JOINT RANGE OF MOTION: ICD-10-CM

## 2025-05-08 DIAGNOSIS — R63.4 WEIGHT LOSS, NON-INTENTIONAL: ICD-10-CM

## 2025-05-08 DIAGNOSIS — M53.3 COCCYX PAIN: ICD-10-CM

## 2025-05-08 DIAGNOSIS — R42 EPISODE OF DIZZINESS: ICD-10-CM

## 2025-05-08 DIAGNOSIS — R22.32 MASS OF FINGER OF LEFT HAND: Primary | ICD-10-CM

## 2025-05-08 DIAGNOSIS — F50.82 AVOIDANT-RESTRICTIVE FOOD INTAKE DISORDER (ARFID): ICD-10-CM

## 2025-05-08 LAB
25(OH)D3 SERPL-MCNC: 28.2 NG/ML (ref 30–100)
ALBUMIN SERPL-MCNC: 5 G/DL (ref 3.5–5.2)
ALBUMIN/GLOB SERPL: 1.5 G/DL
ALP SERPL-CCNC: 59 U/L (ref 39–117)
ALT SERPL W P-5'-P-CCNC: 22 U/L (ref 1–33)
ANION GAP SERPL CALCULATED.3IONS-SCNC: 13 MMOL/L (ref 5–15)
AST SERPL-CCNC: 28 U/L (ref 1–32)
BILIRUB SERPL-MCNC: 0.4 MG/DL (ref 0–1.2)
BUN SERPL-MCNC: 12 MG/DL (ref 6–20)
BUN/CREAT SERPL: 12.1 (ref 7–25)
CALCIUM SPEC-SCNC: 10 MG/DL (ref 8.6–10.5)
CHLORIDE SERPL-SCNC: 101 MMOL/L (ref 98–107)
CO2 SERPL-SCNC: 25 MMOL/L (ref 22–29)
CREAT SERPL-MCNC: 0.99 MG/DL (ref 0.57–1)
DEPRECATED RDW RBC AUTO: 41.4 FL (ref 37–54)
EGFRCR SERPLBLD CKD-EPI 2021: 82.9 ML/MIN/1.73
ERYTHROCYTE [DISTWIDTH] IN BLOOD BY AUTOMATED COUNT: 13.6 % (ref 12.3–15.4)
FERRITIN SERPL-MCNC: 22.4 NG/ML (ref 13–150)
FOLATE SERPL-MCNC: 3.83 NG/ML (ref 4.78–24.2)
GLOBULIN UR ELPH-MCNC: 3.4 GM/DL
GLUCOSE SERPL-MCNC: 90 MG/DL (ref 65–99)
HBA1C MFR BLD: 5.3 % (ref 4.8–5.6)
HCT VFR BLD AUTO: 41.6 % (ref 34–46.6)
HGB BLD-MCNC: 14.2 G/DL (ref 12–15.9)
IRON 24H UR-MRATE: 105 MCG/DL (ref 37–145)
IRON SATN MFR SERPL: 24 % (ref 20–50)
MAGNESIUM SERPL-MCNC: 2.4 MG/DL (ref 1.6–2.6)
MCH RBC QN AUTO: 28.5 PG (ref 26.6–33)
MCHC RBC AUTO-ENTMCNC: 34.1 G/DL (ref 31.5–35.7)
MCV RBC AUTO: 83.4 FL (ref 79–97)
PLATELET # BLD AUTO: 237 10*3/MM3 (ref 140–450)
PMV BLD AUTO: 10.6 FL (ref 6–12)
POTASSIUM SERPL-SCNC: 4.2 MMOL/L (ref 3.5–5.2)
PROT SERPL-MCNC: 8.4 G/DL (ref 6–8.5)
RBC # BLD AUTO: 4.99 10*6/MM3 (ref 3.77–5.28)
SODIUM SERPL-SCNC: 139 MMOL/L (ref 136–145)
T4 FREE SERPL-MCNC: 1.35 NG/DL (ref 0.92–1.68)
TIBC SERPL-MCNC: 432 MCG/DL (ref 298–536)
TRANSFERRIN SERPL-MCNC: 290 MG/DL (ref 200–360)
TSH SERPL DL<=0.05 MIU/L-ACNC: 2.56 UIU/ML (ref 0.27–4.2)
VIT B12 BLD-MCNC: 1197 PG/ML (ref 211–946)
WBC NRBC COR # BLD AUTO: 7.22 10*3/MM3 (ref 3.4–10.8)

## 2025-05-08 PROCEDURE — 84439 ASSAY OF FREE THYROXINE: CPT | Performed by: NURSE PRACTITIONER

## 2025-05-08 PROCEDURE — 82607 VITAMIN B-12: CPT | Performed by: NURSE PRACTITIONER

## 2025-05-08 PROCEDURE — 82728 ASSAY OF FERRITIN: CPT | Performed by: NURSE PRACTITIONER

## 2025-05-08 PROCEDURE — 82306 VITAMIN D 25 HYDROXY: CPT | Performed by: NURSE PRACTITIONER

## 2025-05-08 PROCEDURE — 83540 ASSAY OF IRON: CPT | Performed by: NURSE PRACTITIONER

## 2025-05-08 PROCEDURE — 84466 ASSAY OF TRANSFERRIN: CPT | Performed by: NURSE PRACTITIONER

## 2025-05-08 PROCEDURE — 83735 ASSAY OF MAGNESIUM: CPT | Performed by: NURSE PRACTITIONER

## 2025-05-08 PROCEDURE — 85027 COMPLETE CBC AUTOMATED: CPT | Performed by: NURSE PRACTITIONER

## 2025-05-08 PROCEDURE — 82746 ASSAY OF FOLIC ACID SERUM: CPT | Performed by: NURSE PRACTITIONER

## 2025-05-08 PROCEDURE — 80053 COMPREHEN METABOLIC PANEL: CPT | Performed by: NURSE PRACTITIONER

## 2025-05-08 PROCEDURE — 84443 ASSAY THYROID STIM HORMONE: CPT | Performed by: NURSE PRACTITIONER

## 2025-05-08 PROCEDURE — 83036 HEMOGLOBIN GLYCOSYLATED A1C: CPT | Performed by: NURSE PRACTITIONER

## 2025-05-08 NOTE — PROGRESS NOTES
Chief Complaint  Hand Pain (Left hand bone spurs and causing problems. ) and Weight Loss (She was diagnois with Arfin disease and needs a referral for weight lost. )    Subjective            Ashleigh Magdaleno presents to [facility]  Hand Pain   Pertinent negatives include no chest pain.   Weight Loss  Symptoms include abdominal pain and fatigue.    Pertinent negative symptoms include no chest pain, no nausea and no vomiting.       History of Present Illness  The patient is a 20-year-old female who presents for evaluation of bone spurs, weight loss, and anxiety and panic disorder and actually just wants them mainly evaluation for the bone spurs and for the referral regarding her food issues and would like the referral back to the [place]'s dietitian that helped with that food aversion.    She sought medical attention approximately 7 months ago due to the presence of bone spurs on her tailbone and finger, which have since increased in size. The bone spur on her left hand has grown significantly, causing pain during daily activities such as driving and limiting her finger's range of motion. The bone spur on her tailbone, initially small, has also enlarged, causing discomfort when sitting. 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 She reports no possibility of pregnancy. She has previously undergone x-rays.    She has been diagnosed with Avoidant Restrictive Food Intake Disorder (ARFID) and has experienced significant weight loss, dropping from 165 pounds to her current weight. Despite medication, her condition did not improve. She was also being treated for panic disorder concurrently. She was under the care of a mental health provider, Radha at [facility], but discontinued visits due to lack of progress and loss of insurance. She was recommended food exposure therapy, which she had previously undergone during a severe weight loss episode. Her diet has  been limited to muffins, mashed potatoes, and milk for the past year. She experiences abdominal pain when attempting to eat other foods but reports normal bowel movements. She does not experience constipation, diarrhea, or vomiting. She has been experiencing dizziness and fainting spells for the past 6 months, with the most recent episode occurring 1.5 weeks ago. She is unsure if she has trouble swallowing, but she is fearful of eating because she choked on a piece of food. She started getting better but had to go to [facility] due to an allergic reaction to tuna fish, despite having eaten it regularly before. She also reports fatigue and increased thirst, consuming a jug of milk in half a day. She received fluids during her last hospital visit 4 to 6 months ago.    GYNECOLOGICAL HISTORY:  - Frequency and Flow: Irregular, sometimes going without a period for up to 3 months      PHQ-2 Total Score: 0    PHQ-9 Total Score:        Past Medical History:   Diagnosis Date    Anxiety     Chlamydia infection during pregnancy 03/03/2022    Febrile seizure     PCOS (polycystic ovarian syndrome)     PFO (patent foramen ovale)        Allergies   Allergen Reactions    Contrast Dye (Echo Or Unknown Ct/Mr) Unknown - High Severity    Buspirone Dizziness and Other (See Comments)     dizziness        Past Surgical History:   Procedure Laterality Date    ADENOIDECTOMY      APPENDECTOMY      LIPOMA EXCISION      foot    TONSILLECTOMY          Social History     Tobacco Use    Smoking status: Never     Passive exposure: Never    Smokeless tobacco: Never   Vaping Use    Vaping status: Every Day    Substances: Nicotine    Devices: Disposable   Substance Use Topics    Alcohol use: Never    Drug use: Never       Family History   Problem Relation Age of Onset    No Known Problems Father     No Known Problems Mother     Ovarian cancer Maternal Grandmother     Colon cancer Maternal Grandmother     Breast cancer Maternal Grandmother 30     Prostate cancer Neg Hx     Uterine cancer Neg Hx         Health Maintenance Due   Topic Date Due    ANNUAL PHYSICAL  Never done    CHLAMYDIA SCREENING  04/05/2023    PAP SMEAR  Never done        Current Outpatient Medications on File Prior to Visit   Medication Sig    Etonogestrel (NEXPLANON) 68 MG implant subdermal implant Inject 1 each into the appropriate area of the skin as directed by provider 1 (One) Time.    [DISCONTINUED] cefuroxime (CEFTIN) 500 MG tablet Take 1 tablet by mouth 2 (Two) Times a Day. (Patient not taking: Reported on 5/8/2025)    [DISCONTINUED] diclofenac (VOLTAREN) 75 MG EC tablet Take 1 tablet by mouth 2 (Two) Times a Day As Needed (pain). (Patient not taking: Reported on 5/8/2025)    [DISCONTINUED] Lidocaine Viscous HCl (XYLOCAINE) 2 % solution Take 5 mL by mouth As Needed for Mild Pain. Saturate gauze or cotton ball and apply to affected tooth as needed for pain (Patient not taking: Reported on 5/8/2025)    [DISCONTINUED] spironolactone (ALDACTONE) 25 MG tablet Take 1 tablet by mouth Daily. (Patient not taking: Reported on 5/8/2025)     No current facility-administered medications on file prior to visit.       Immunization History   Administered Date(s) Administered    DTaP 2003, 2003, 2003, 05/10/2004, 02/21/2007    DTaP, Unspecified 2003, 2003, 2003, 05/10/2004, 02/21/2007    HPV Bivalent 02/07/2019    Hep A, 2 Dose 07/08/2014, 08/07/2018    Hep B / HiB 2003    Hep B, Adolescent or Pediatric 2003, 2003, 01/28/2004, 12/08/2004    Hepatitis A 07/08/2014, 08/07/2018    Hepatitis B Adult/Adolescent IM 2003, 2003, 12/18/2004    HiB 2003, 2003, 12/08/2004    Hib (PRP-OMP) 2003    Hib (PRP-T) 2003, 2003, 12/08/2004    IPV 2003, 2003, 01/28/2004, 12/08/2004, 02/21/2007    MMR 01/28/2004, 12/08/2004, 02/21/2007, 09/04/2022    MMRV 02/21/2007    Meningococcal Conjugate 07/08/2014, 02/07/2019  "   Meningococcal MCV4P (Menactra) 07/08/2014, 02/07/2019    PEDS-Pneumococcal Conjugate (PCV7) 2003, 2003, 2003    Pneumococcal Conjugate 13-Valent (PCV13) 2003, 2003, 2003    Tdap 09/27/2013, 06/05/2024    Varicella 05/10/2004, 07/08/2014       Review of Systems   Constitutional:  Positive for fatigue and unexpected weight loss.   HENT:  Negative for trouble swallowing.    Eyes:  Negative for blurred vision and double vision.   Respiratory:  Negative for shortness of breath.    Cardiovascular:  Negative for chest pain.   Gastrointestinal:  Positive for abdominal pain. Negative for blood in stool, constipation, diarrhea, nausea and vomiting.        Some generalized abd pains at times    Endocrine: Positive for polydipsia. Negative for polyphagia and polyuria.   Genitourinary:  Negative for menstrual problem.        Has the nexplanon    Musculoskeletal:  Positive for arthralgias and joint swelling.        Of the left hand middle finger base of finger--and then also to the right side of the mid lower coccyx area    Neurological:  Positive for dizziness. Negative for seizures, syncope and light-headedness.        Fainted --Hx and done this for 1 yr    Psychiatric/Behavioral:  Negative for self-injury, suicidal ideas and depressed mood. The patient is nervous/anxious.         Objective     /64   Pulse 92   Temp 97.7 °F (36.5 °C) (Temporal)   Ht 162.6 cm (64\")   Wt 49.4 kg (109 lb)   SpO2 99%   BMI 18.71 kg/m²       Physical Exam  Vitals and nursing note reviewed.   Constitutional:       Appearance: Normal appearance.      Comments: Current wt at 109# and body mass index 18.71   HENT:      Head: Normocephalic.      Right Ear: External ear normal.      Left Ear: External ear normal.      Nose: Nose normal.      Mouth/Throat:      Mouth: Mucous membranes are moist.   Eyes:      Pupils: Pupils are equal, round, and reactive to light.   Cardiovascular:      Rate and Rhythm: " Normal rate and regular rhythm.      Heart sounds: Normal heart sounds.   Pulmonary:      Effort: Pulmonary effort is normal.      Breath sounds: Normal breath sounds.   Abdominal:      General: There is no distension.      Palpations: Abdomen is soft. There is no mass.      Tenderness: There is no right CVA tenderness, left CVA tenderness, guarding or rebound.      Hernia: No hernia is present.      Comments: But reports experiences the chronic abd pains    Musculoskeletal:         General: Swelling and tenderness present. No signs of injury.        Hands:       Cervical back: Normal range of motion and neck supple.        Back:    Skin:     General: Skin is warm and dry.   Neurological:      Mental Status: She is alert and oriented to person, place, and time.   Psychiatric:         Mood and Affect: Mood normal.         Behavior: Behavior normal.         Thought Content: Thought content normal.         Judgment: Judgment normal.         Result Review :                ECG 12 Lead ED Triage Standing Order; SOA (09/12/2024 23:55)  Holter Monitor - 48 Hour (12/18/2023 10:37)  ECG 12 Lead Chest Pain (01/18/2023 17:14)  ADULT TRANSTHORACIC ECHO COMPLETE W/ CONT IF NECESSARY PER PROTOCOL (07/01/2022 10:04)  ECG 12 Lead (03/15/2022 09:55)  XR Hand 2 View Left (08/28/2024 00:27)   Results  XR Sacrum & Coccyx (10/26/2022 11:56)       Imaging   - X-ray of the sacrum and coccyx area: 2022, No significant findings    XR Hand 3+ View Left (In Office) (05/08/2025 16:00)  XR Sacrum & Coccyx (In Office) (05/08/2025 16:00)           Assessment and Plan      Diagnoses and all orders for this visit:    1. Mass of finger of left hand (Primary)  -     XR Hand 3+ View Left (In Office)  -     Ambulatory Referral to Orthopedic Surgery    2. Coccyx pain  -     XR Sacrum & Coccyx (In Office)  -     Ambulatory Referral to Orthopedic Surgery    3. Limited joint range of motion  -     XR Sacrum & Coccyx (In Office)  -     XR Hand 3+ View Left  (In Office)  -     Ambulatory Referral to Orthopedic Surgery    4. Avoidant-restrictive food intake disorder (ARFID)  -     Cancel: Ambulatory Referral to Nutrition Services  -     Vitamin B12 & Folate  -     TSH+Free T4  -     CBC (No Diff)  -     Comprehensive Metabolic Panel  -     Vitamin D,25-Hydroxy  -     Iron Profile  -     Ferritin  -     Ambulatory Referral to Nutrition Services    5. Polydipsia  -     Iron Profile  -     Ferritin  -     Hemoglobin A1c    6. Weight loss, non-intentional  -     Iron Profile  -     Ferritin  -     Hemoglobin A1c    7. Episode of dizziness  -     Iron Profile  -     Ferritin  -     Magnesium      Labs ordered as noted above    X-rays ordered as noted above    Referral to orthopedics    And referral back to the nutrition services through [facility] that she had been with before    In regarding her annual well woman Pap smear Chlamydia screening she sees a GYN and has a Nexplanon at this time    Assessment & Plan  1. Bone spurs.  - Significant growth in bone spurs on the left hand and tailbone area since the last visit 7 months ago.  - The left hand mass is affecting daily activities, including driving, due to pain and limited range of motion. The tailbone mass causes discomfort when sitting.  - Physical exam reveals palpable hard masses on the left hand and tailbone area.  - X-rays will be ordered to assess the current state of the bone spurs. Referral to an orthopedist will be made for further evaluation and management.    2. Avoidant Restrictive Food Intake Disorder (ARFID).  - Significant weight loss from 165 pounds to 99 pounds, with a limited diet consisting mainly of muffins, mashed potatoes, and milk.  - Reports abdominal pain when attempting to eat other foods and frequent dizziness and fainting episodes over the past year.  - Blood work will be ordered to assess nutritional status and rule out any underlying conditions.  - Referral to a dietitian will be made to provide  specialized dietary counseling and potential food exposure therapy.    3. Anxiety and panic disorder.  - History of anxiety and panic disorder, previously managed with medications.  - Reports discontinuing mental health services due to lack of improvement and loss of insurance.  - Referral to a mental health provider will be made to reassess the condition and explore alternative treatment options.    4. Nexplanon implant.  - Patient reports irregular periods, sometimes having none for up to 3 months.  - Physical exam findings and patient history reviewed.  - No changes in management of Nexplanon implant at this time.          Follow Up     Return if symptoms worsen or fail to improve.    Patient was given instructions and counseling regarding her condition or for health maintenance advice. Please see specific information pulled into the AVS if appropriate.     BMI is within normal parameters. No other follow-up for BMI required.      Ashleigh Magdaleno  reports that she has never smoked. She has never been exposed to tobacco smoke. She has never used smokeless tobacco. I have educated her on the risk of diseases from using tobacco products such as cancer, COPD, and heart disease.         Patient or patient representative verbalized consent for the use of Ambient Listening during the visit with  PETE Stephens for chart documentation. 5/8/2025  15:19 EDT

## 2025-05-08 NOTE — PROGRESS NOTES
..  Venipuncture Blood Specimen Collection  Venipuncture performed in Lt arm by Zonia Lyn with good hemostasis. Patient tolerated the procedure well without complications.   05/08/25   Love Fountain MA

## 2025-05-09 ENCOUNTER — TELEPHONE (OUTPATIENT)
Dept: FAMILY MEDICINE CLINIC | Facility: CLINIC | Age: 20
End: 2025-05-09
Payer: COMMERCIAL

## 2025-05-09 DIAGNOSIS — M53.3 COCCYXDYNIA: ICD-10-CM

## 2025-05-09 DIAGNOSIS — E53.8 FOLIC ACID DEFICIENCY: Primary | ICD-10-CM

## 2025-05-09 DIAGNOSIS — M53.3 COCCYX PAIN: Primary | ICD-10-CM

## 2025-05-09 RX ORDER — FOLIC ACID 1 MG/1
1 TABLET ORAL DAILY
Qty: 90 TABLET | Refills: 3 | Status: SHIPPED | OUTPATIENT
Start: 2025-05-09

## 2025-07-27 ENCOUNTER — APPOINTMENT (OUTPATIENT)
Dept: GENERAL RADIOLOGY | Facility: HOSPITAL | Age: 20
End: 2025-07-27
Payer: MEDICAID

## 2025-07-27 ENCOUNTER — HOSPITAL ENCOUNTER (EMERGENCY)
Facility: HOSPITAL | Age: 20
Discharge: HOME | End: 2025-07-27
Attending: EMERGENCY MEDICINE | Admitting: EMERGENCY MEDICINE
Payer: MEDICAID

## 2025-07-27 VITALS
DIASTOLIC BLOOD PRESSURE: 80 MMHG | TEMPERATURE: 98.3 F | HEART RATE: 87 BPM | OXYGEN SATURATION: 100 % | RESPIRATION RATE: 16 BRPM | HEIGHT: 64 IN | BODY MASS INDEX: 18.59 KG/M2 | SYSTOLIC BLOOD PRESSURE: 127 MMHG | WEIGHT: 108.91 LBS

## 2025-07-27 DIAGNOSIS — K60.2 ANAL FISSURE: Primary | ICD-10-CM

## 2025-07-27 LAB
ALBUMIN SERPL-MCNC: 5.1 G/DL (ref 3.5–5.2)
ALBUMIN/GLOB SERPL: 1.8 G/DL
ALP SERPL-CCNC: 48 U/L (ref 39–117)
ALT SERPL W P-5'-P-CCNC: 27 U/L (ref 1–33)
ANION GAP SERPL CALCULATED.3IONS-SCNC: 10.5 MMOL/L (ref 5–15)
AST SERPL-CCNC: 25 U/L (ref 1–32)
BACTERIA UR QL AUTO: NORMAL /HPF
BASOPHILS # BLD AUTO: 0.04 10*3/MM3 (ref 0–0.2)
BASOPHILS NFR BLD AUTO: 0.5 % (ref 0–1.5)
BILIRUB SERPL-MCNC: 0.4 MG/DL (ref 0–1.2)
BILIRUB UR QL STRIP: NEGATIVE
BUN SERPL-MCNC: 13.1 MG/DL (ref 6–20)
BUN/CREAT SERPL: 21.1 (ref 7–25)
CALCIUM SPEC-SCNC: 9.9 MG/DL (ref 8.6–10.5)
CHLORIDE SERPL-SCNC: 102 MMOL/L (ref 98–107)
CLARITY UR: ABNORMAL
CO2 SERPL-SCNC: 25.5 MMOL/L (ref 22–29)
COLOR UR: YELLOW
CREAT SERPL-MCNC: 0.62 MG/DL (ref 0.57–1)
DEPRECATED RDW RBC AUTO: 39 FL (ref 37–54)
EGFRCR SERPLBLD CKD-EPI 2021: 129.3 ML/MIN/1.73
EOSINOPHIL # BLD AUTO: 0.06 10*3/MM3 (ref 0–0.4)
EOSINOPHIL NFR BLD AUTO: 0.8 % (ref 0.3–6.2)
ERYTHROCYTE [DISTWIDTH] IN BLOOD BY AUTOMATED COUNT: 13 % (ref 12.3–15.4)
GLOBULIN UR ELPH-MCNC: 2.9 GM/DL
GLUCOSE SERPL-MCNC: 85 MG/DL (ref 65–99)
GLUCOSE UR STRIP-MCNC: NEGATIVE MG/DL
HCG INTACT+B SERPL-ACNC: <0.5 MIU/ML
HCT VFR BLD AUTO: 41.1 % (ref 34–46.6)
HGB BLD-MCNC: 13.7 G/DL (ref 12–15.9)
HGB UR QL STRIP.AUTO: NEGATIVE
HOLD SPECIMEN: NORMAL
HOLD SPECIMEN: NORMAL
HYALINE CASTS UR QL AUTO: NORMAL /LPF
IMM GRANULOCYTES # BLD AUTO: 0.01 10*3/MM3 (ref 0–0.05)
IMM GRANULOCYTES NFR BLD AUTO: 0.1 % (ref 0–0.5)
KETONES UR QL STRIP: NEGATIVE
LEUKOCYTE ESTERASE UR QL STRIP.AUTO: ABNORMAL
LIPASE SERPL-CCNC: 57 U/L (ref 13–60)
LYMPHOCYTES # BLD AUTO: 2.39 10*3/MM3 (ref 0.7–3.1)
LYMPHOCYTES NFR BLD AUTO: 32.8 % (ref 19.6–45.3)
MCH RBC QN AUTO: 27.5 PG (ref 26.6–33)
MCHC RBC AUTO-ENTMCNC: 33.3 G/DL (ref 31.5–35.7)
MCV RBC AUTO: 82.4 FL (ref 79–97)
MONOCYTES # BLD AUTO: 0.61 10*3/MM3 (ref 0.1–0.9)
MONOCYTES NFR BLD AUTO: 8.4 % (ref 5–12)
NEUTROPHILS NFR BLD AUTO: 4.18 10*3/MM3 (ref 1.7–7)
NEUTROPHILS NFR BLD AUTO: 57.4 % (ref 42.7–76)
NITRITE UR QL STRIP: NEGATIVE
NRBC BLD AUTO-RTO: 0 /100 WBC (ref 0–0.2)
PH UR STRIP.AUTO: 8 [PH] (ref 5–8)
PLATELET # BLD AUTO: 219 10*3/MM3 (ref 140–450)
PMV BLD AUTO: 9.5 FL (ref 6–12)
POTASSIUM SERPL-SCNC: 4.1 MMOL/L (ref 3.5–5.2)
PROT SERPL-MCNC: 8 G/DL (ref 6–8.5)
PROT UR QL STRIP: NEGATIVE
RBC # BLD AUTO: 4.99 10*6/MM3 (ref 3.77–5.28)
RBC # UR STRIP: NORMAL /HPF
REF LAB TEST METHOD: NORMAL
SODIUM SERPL-SCNC: 138 MMOL/L (ref 136–145)
SP GR UR STRIP: 1.01 (ref 1–1.03)
SQUAMOUS #/AREA URNS HPF: NORMAL /HPF
UROBILINOGEN UR QL STRIP: ABNORMAL
WBC # UR STRIP: NORMAL /HPF
WBC NRBC COR # BLD AUTO: 7.29 10*3/MM3 (ref 3.4–10.8)
WHOLE BLOOD HOLD COAG: NORMAL
WHOLE BLOOD HOLD SPECIMEN: NORMAL

## 2025-07-27 PROCEDURE — 81001 URINALYSIS AUTO W/SCOPE: CPT | Performed by: EMERGENCY MEDICINE

## 2025-07-27 PROCEDURE — 74018 RADEX ABDOMEN 1 VIEW: CPT

## 2025-07-27 PROCEDURE — 83690 ASSAY OF LIPASE: CPT | Performed by: EMERGENCY MEDICINE

## 2025-07-27 PROCEDURE — 84702 CHORIONIC GONADOTROPIN TEST: CPT | Performed by: EMERGENCY MEDICINE

## 2025-07-27 PROCEDURE — 99283 EMERGENCY DEPT VISIT LOW MDM: CPT

## 2025-07-27 PROCEDURE — 85025 COMPLETE CBC W/AUTO DIFF WBC: CPT | Performed by: EMERGENCY MEDICINE

## 2025-07-27 PROCEDURE — 80053 COMPREHEN METABOLIC PANEL: CPT | Performed by: EMERGENCY MEDICINE

## 2025-07-27 RX ORDER — SODIUM CHLORIDE 0.9 % (FLUSH) 0.9 %
10 SYRINGE (ML) INJECTION AS NEEDED
Status: DISCONTINUED | OUTPATIENT
Start: 2025-07-27 | End: 2025-07-28 | Stop reason: HOSPADM

## 2025-07-27 NOTE — ED PROVIDER NOTES
Time: 7:48 PM EDT  Date of encounter:  7/27/2025  Independent Historian/Clinical History and Information was obtained by:   Patient    History is limited by: N/A    Chief Complaint   Patient presents with    Rectal Bleeding    Dizziness         History of Present Illness:  Patient is a 20 y.o. year old female who presents to the emergency department for evaluation of constipation.  Patient states she fell and had a bowel movement today but when she did she noticed some bright red blood.  Patient states she was dizzy shortly thereafter but is now no longer dizzy. Patient was seen by provider in triage, Alf Cade PA-C      Patient Care Team  Primary Care Provider: Lydia Lacy APRN    Past Medical History:     Allergies   Allergen Reactions    Contrast Dye (Echo Or Unknown Ct/Mr) Unknown - High Severity    Fish-Derived Products Itching    Buspirone Dizziness and Other (See Comments)     dizziness     Past Medical History:   Diagnosis Date    Anxiety     Chlamydia infection during pregnancy 03/03/2022    Febrile seizure     PCOS (polycystic ovarian syndrome)     PFO (patent foramen ovale)      Past Surgical History:   Procedure Laterality Date    ADENOIDECTOMY      APPENDECTOMY      LIPOMA EXCISION      foot    TONSILLECTOMY       Family History   Problem Relation Age of Onset    No Known Problems Father     No Known Problems Mother     Ovarian cancer Maternal Grandmother     Colon cancer Maternal Grandmother     Breast cancer Maternal Grandmother 30    Prostate cancer Neg Hx     Uterine cancer Neg Hx        Home Medications:  Prior to Admission medications    Medication Sig Start Date End Date Taking? Authorizing Provider   Etonogestrel (NEXPLANON) 68 MG implant subdermal implant Inject 1 each into the appropriate area of the skin as directed by provider 1 (One) Time.    Provider, MD Concepción   folic acid (FOLVITE) 1 MG tablet Take 1 tablet by mouth Daily. 5/9/25   Lydia Lacy APRN     "    Social History:   Social History     Tobacco Use    Smoking status: Never     Passive exposure: Never    Smokeless tobacco: Never   Vaping Use    Vaping status: Every Day    Substances: Nicotine    Devices: Disposable   Substance Use Topics    Alcohol use: Never    Drug use: Never         Review of Systems:  Review of Systems   Constitutional:  Negative for chills and fever.   HENT:  Negative for congestion, ear pain and sore throat.    Eyes:  Negative for pain.   Respiratory:  Negative for cough, chest tightness and shortness of breath.    Cardiovascular:  Negative for chest pain.   Gastrointestinal:  Positive for blood in stool, constipation and rectal pain. Negative for abdominal pain, diarrhea, nausea and vomiting.   Genitourinary:  Negative for flank pain and hematuria.   Musculoskeletal:  Negative for joint swelling.   Skin:  Negative for pallor.   Neurological:  Positive for dizziness. Negative for seizures and headaches.   All other systems reviewed and are negative.       Physical Exam:  /80 (BP Location: Right arm, Patient Position: Sitting)   Pulse 87   Temp 98.3 °F (36.8 °C) (Oral)   Resp 16   Ht 162.6 cm (64\")   Wt 49.4 kg (108 lb 14.5 oz)   SpO2 100%   BMI 18.69 kg/m²         Physical Exam  Vitals and nursing note reviewed.   Constitutional:       General: She is not in acute distress.     Appearance: Normal appearance. She is not toxic-appearing.   HENT:      Head: Normocephalic and atraumatic.      Mouth/Throat:      Mouth: Mucous membranes are moist.   Eyes:      General: No scleral icterus.     Pupils: Pupils are equal, round, and reactive to light.   Cardiovascular:      Rate and Rhythm: Normal rate and regular rhythm.      Pulses: Normal pulses.      Heart sounds: Normal heart sounds.   Pulmonary:      Effort: Pulmonary effort is normal. No respiratory distress.      Breath sounds: Normal breath sounds.   Abdominal:      General: Abdomen is flat. There is no distension.      " Palpations: Abdomen is soft.      Tenderness: There is no abdominal tenderness.   Genitourinary:     Comments: Anal fissure present.  Musculoskeletal:         General: Normal range of motion.      Cervical back: Normal range of motion and neck supple.   Skin:     General: Skin is warm and dry.   Neurological:      General: No focal deficit present.      Mental Status: She is alert and oriented to person, place, and time. Mental status is at baseline.   Psychiatric:         Mood and Affect: Mood normal.         Behavior: Behavior normal.                            Medical Decision Making:      Comorbidities that affect care:    None    External Notes reviewed:    Previous Clinic Note: Office visit for pain in the coccyx      The following orders were placed and all results were independently analyzed by me:  Orders Placed This Encounter   Procedures    XR Abdomen KUB    [place] Draw    Comprehensive Metabolic Panel    Lipase    Urinalysis With Microscopic If Indicated (No Culture) - Urine, Clean Catch    hCG, Quantitative, Pregnancy    CBC Auto Differential    Urinalysis, Microscopic Only - Urine, Clean Catch    CBC & Differential    Green Top (Gel)    Lavender Top    Gold Top - SST    Light Blue Top       Medications Given in the Emergency Department:  Medications - No data to display       ED Course:    The patient was initially evaluated in the triage area where orders were placed. The patient was later dispositioned by Celio Maldonado DO.      The patient was advised to stay for completion of workup which includes but is not limited to communication of labs and radiological results, reassessment and plan. The patient was advised that leaving prior to disposition by a provider could result in critical findings that are not communicated to the patient.     ED Course as of 07/28/25 0553   Sun Jul 27, 2025   1950 Provider In Triage: Patient was evaluated by me in triage, Alf Cade PA-C. Orders were placed and  the patient is currently awaiting final results and disposition.   [SK]      ED Course User Index  [SK] Alf Cade PA-C       Labs:    Lab Results (last 24 hours)       Procedure Component Value Units Date/Time    CBC & Differential [[id number]]  (Normal) Collected: 07/27/25 1952    Specimen: Blood Updated: 07/27/25 2001    Narrative:      The following orders were created for panel order CBC & Differential.  Procedure                               Abnormality         Status                     ---------                               -----------         ------                     CBC Auto Differential[[id number]]        Normal              Final result                 Please view results for these tests on the individual orders.    Comprehensive Metabolic Panel [[id number]] Collected: 07/27/25 1952    Specimen: Blood Updated: 07/27/25 2019     Glucose 85 mg/dL      BUN 13.1 mg/dL      Creatinine 0.62 mg/dL      Sodium 138 mmol/L      Potassium 4.1 mmol/L      Chloride 102 mmol/L      CO2 25.5 mmol/L      Calcium 9.9 mg/dL      Total Protein 8.0 g/dL      Albumin 5.1 g/dL      ALT (SGPT) 27 U/L      AST (SGOT) 25 U/L      Alkaline Phosphatase 48 U/L      Total Bilirubin 0.4 mg/dL      Globulin 2.9 gm/dL      A/G Ratio 1.8 g/dL      BUN/Creatinine Ratio 21.1     Anion Gap 10.5 mmol/L      eGFR 129.3 mL/min/1.73     Narrative:      GFR Categories in Chronic Kidney Disease (CKD)              GFR Category          GFR (mL/min/1.73)    Interpretation  G1                    90 or greater        Normal or high (1)  G2                    60-89                Mild decrease (1)  G3a                   45-59                Mild to moderate decrease  G3b                   30-44                Moderate to severe decrease  G4                    15-29                Severe decrease  G5                    14 or less           Kidney failure    (1)In the absence of evidence of kidney disease, neither GFR category G1 or G2 fulfill  the criteria for CKD.    eGFR calculation 2021 CKD-EPI creatinine equation, which does not include race as a factor    Lipase [[id number]]  (Normal) Collected: 07/27/25 1952    Specimen: Blood Updated: 07/27/25 2019     Lipase 57 U/L     hCG, Quantitative, Pregnancy [[id number]] Collected: 07/27/25 1952    Specimen: Blood Updated: 07/27/25 2022     HCG Quantitative <0.50 mIU/mL     Narrative:      HCG Ranges by Gestational Age    Females - non-pregnant premenopausal   </= 1mIU/mL HCG  Females - postmenopausal               </= 7mIU/mL HCG    3 Weeks       5.4   -      72 mIU/mL  4 Weeks      10.2   -     708 mIU/mL  5 Weeks       217   -   8,245 mIU/mL  6 Weeks       152   -  32,177 mIU/mL  7 Weeks     4,059   - 153,767 mIU/mL  8 Weeks    31,366   - 149,094 mIU/mL  9 Weeks    59,109   - 135,901 mIU/mL  10 Weeks   44,186   - 170,409 mIU/mL  12 Weeks   27,107   - 201,615 mIU/mL  14 Weeks   24,302   -  93,646 mIU/mL  15 Weeks   12,540   -  69,747 mIU/mL  16 Weeks    8,904   -  55,332 mIU/mL  17 Weeks    8,240   -  51,793 mIU/mL  18 Weeks    9,649   -  55,271 mIU/mL      CBC Auto Differential [[id number]]  (Normal) Collected: 07/27/25 1952    Specimen: Blood Updated: 07/27/25 2001     WBC 7.29 10*3/mm3      RBC 4.99 10*6/mm3      Hemoglobin 13.7 g/dL      Hematocrit 41.1 %      MCV 82.4 fL      MCH 27.5 pg      MCHC 33.3 g/dL      RDW 13.0 %      RDW-SD 39.0 fl      MPV 9.5 fL      Platelets 219 10*3/mm3      Neutrophil % 57.4 %      Lymphocyte % 32.8 %      Monocyte % 8.4 %      Eosinophil % 0.8 %      Basophil % 0.5 %      Immature Grans % 0.1 %      Neutrophils, Absolute 4.18 10*3/mm3      Lymphocytes, Absolute 2.39 10*3/mm3      Monocytes, Absolute 0.61 10*3/mm3      Eosinophils, Absolute 0.06 10*3/mm3      Basophils, Absolute 0.04 10*3/mm3      Immature Grans, Absolute 0.01 10*3/mm3      nRBC 0.0 /100 WBC     Urinalysis With Microscopic If Indicated (No Culture) - Urine, Clean Catch [[id number]]  (Abnormal) Collected:  07/27/25 1955    Specimen: Urine, Clean Catch Updated: 07/27/25 2010     Color, UA Yellow     Appearance, UA Cloudy     pH, UA 8.0     Specific Gravity, UA 1.007     Glucose, UA Negative     Ketones, UA Negative     Bilirubin, UA Negative     Blood, UA Negative     Protein, UA Negative     Leuk Esterase, UA Trace     Nitrite, UA Negative     Urobilinogen, UA 0.2 E.U./dL    Urinalysis, Microscopic Only - Urine, Clean Catch [[id number]] Collected: 07/27/25 1955    Specimen: Urine, Clean Catch Updated: 07/27/25 2010     RBC, UA 0-2 /HPF      WBC, UA 0-2 /HPF      Bacteria, UA None Seen /HPF      Squamous Epithelial Cells, UA 0-2 /HPF      Hyaline Casts, UA None Seen /LPF      Methodology Automated Microscopy             Imaging:    XR Abdomen KUB  Result Date: 7/27/2025  XR ABDOMEN KUB Date of exam: 7/27/2025 8:25 PM EDT. Comparison: 11/25/2024. INDICATIONS: 20-year-old female with lower abdominal pain; constipation. FINDINGS: Two (2) AP supine views of the abdomen and pelvis reveal no mechanical bowel obstruction. No significant stool burden is noted. External artifacts are noted. There is possible minimal lateral curvature of the lumbar spine with the convexity to the left, which may be fixed or positional in nature.     A nonobstructive bowel gas pattern is suggested radiographically. Portions of this note were completed with a voice recognition program. Electronically Signed: Dirk Sher MD  7/27/2025 8:47 PM EDT  Workstation ID: [id number]        Differential Diagnosis and Discussion:      GI Bleeding: Differential diagnosis includes but is not limited to gastritis, gastric ulcer, stress ulcer, duodenitis, Deysi-Gonzáles tears, esophageal varices, angiodysplasia, aortic enteric fistula, hematologic issues including thrombocytopenia, GI neoplasm, ulcerative colitis, Crohn's disease, diverticulosis, diverticulitis, hemorrhoids, aortic aneurysm, and polyps    PROCEDURES:    Labs were collected in the emergency  department and all labs were reviewed and interpreted by me.  X-ray were performed in the emergency department and all X-ray impressions were independently interpreted by me.    No orders to display        Procedures    MDM     Amount and/or Complexity of Data Reviewed  Clinical lab tests: reviewed  Tests in the radiology section of CPT®: reviewed                     Patient Care Considerations:    CT ABDOMEN AND PELVIS: I considered ordering a CT scan of the abdomen and pelvis however the patient currently has no abdominal pain or tenderness      Consultants/Shared Management Plan:    None    Social Determinants of Health:    Patient has presented with family members who are responsible, reliable and will ensure follow up care.      Disposition and Care Coordination:    Discharged: I considered escalation of care by admitting this patient to the hospital, however patient has no active bleeding and no signs of hemorrhage or hypotension    I have explained discharge medications and the need for follow up with the patient/caretakers. This was also printed in the discharge instructions. Patient was discharged with the following medications and follow up:      Medication List      No changes were made to your prescriptions during this visit.      Lydia Lacy, APRN  [address] [place] [address] [phone]    In 1 day         Final diagnoses:   Anal fissure        ED Disposition       ED Disposition   Discharge    Condition   Stable    Comment   --               This medical record created using voice recognition software.             Celio Maldonado, DO  07/28/25 0553

## 2025-07-28 NOTE — DISCHARGE INSTRUCTIONS
Take Citrucel or Metamucil large glass of water daily.  Wipe with baby wipes or moistened toilet tissue.  Apply Preparation H or Anusol to the anal area after each bowel movement.  Return for worsening symptoms.  Follow-up with your doctor in 1 week.